# Patient Record
Sex: FEMALE | Race: WHITE | Employment: OTHER
[De-identification: names, ages, dates, MRNs, and addresses within clinical notes are randomized per-mention and may not be internally consistent; named-entity substitution may affect disease eponyms.]

---

## 2017-02-01 ENCOUNTER — TELEPHONE (OUTPATIENT)
Dept: CASE MANAGEMENT | Age: 72
End: 2017-02-01

## 2018-02-01 PROBLEM — H93.13 TINNITUS OF BOTH EARS: Status: ACTIVE | Noted: 2018-02-01

## 2018-07-18 ENCOUNTER — HOSPITAL ENCOUNTER (OUTPATIENT)
Dept: CT IMAGING | Age: 73
Discharge: HOME | End: 2018-07-20
Payer: COMMERCIAL

## 2018-07-18 DIAGNOSIS — J44.9 CHRONIC OBSTRUCTIVE PULMONARY DISEASE, UNSPECIFIED COPD TYPE (HCC): ICD-10-CM

## 2018-07-18 PROCEDURE — 71250 CT THORAX DX C-: CPT

## 2018-08-02 ENCOUNTER — OFFICE VISIT (OUTPATIENT)
Dept: PULMONOLOGY | Age: 73
End: 2018-08-02
Payer: COMMERCIAL

## 2018-08-02 VITALS
SYSTOLIC BLOOD PRESSURE: 138 MMHG | DIASTOLIC BLOOD PRESSURE: 78 MMHG | BODY MASS INDEX: 32.1 KG/M2 | RESPIRATION RATE: 12 BRPM | TEMPERATURE: 98.2 F | OXYGEN SATURATION: 95 % | WEIGHT: 188 LBS | HEART RATE: 98 BPM | HEIGHT: 64 IN

## 2018-08-02 DIAGNOSIS — J44.0 CHRONIC OBSTRUCTIVE PULMONARY DISEASE WITH ACUTE LOWER RESPIRATORY INFECTION (HCC): Primary | ICD-10-CM

## 2018-08-02 DIAGNOSIS — R91.1 LUNG NODULE: ICD-10-CM

## 2018-08-02 DIAGNOSIS — F17.200 SMOKER: ICD-10-CM

## 2018-08-02 DIAGNOSIS — R93.89 ABNORMAL RADIOGRAPH: ICD-10-CM

## 2018-08-02 LAB
DLCO %PRED: NORMAL
DLCO/VA %PRED: NORMAL
DLCO: NORMAL ML/MMHG SEC
FEF 25-75% PRE PRED: 1.84
FEF 25-75%-PRE: 2.28
FEV1 %PRED-PRE: 102
FEV1/FVC PRED: 78
FEV1/FVC: 80 %
FEV1: 2.24 LITERS
FEV3 PRE: 2.6
FVC %PRED-PRE: 98
FVC: 2.79 LITERS
MEP: NORMAL
MIP: NORMAL
PEF %PRED-PRE: 5.58
PEF-PRE: 7.18 L/SEC
TLC %PRED: NORMAL
TLC: NORMAL LITERS

## 2018-08-02 PROCEDURE — 99213 OFFICE O/P EST LOW 20 MIN: CPT | Performed by: INTERNAL MEDICINE

## 2018-08-02 PROCEDURE — 99214 OFFICE O/P EST MOD 30 MIN: CPT | Performed by: INTERNAL MEDICINE

## 2018-08-02 PROCEDURE — 94010 BREATHING CAPACITY TEST: CPT | Performed by: INTERNAL MEDICINE

## 2018-08-02 ASSESSMENT — PULMONARY FUNCTION TESTS
FEV1: 2.24
FEV1/FVC_PREDICTED: 78
FEV1_PERCENT_PREDICTED_PRE: 102
FVC: 2.79
FVC_PERCENT_PREDICTED_PRE: 98
FEV1/FVC: 80

## 2018-08-02 NOTE — PROGRESS NOTES
stable (2018) when compared with the previous exam.  A mass like lesion is noted projecting within the left breast soft tissues, measuring approximately 2.4 x 2.4 cm, partially excluded from the field-of-view. Finding was present on the previous exam. The breast issue is followed yearly has yearly US = breast cysts. ([facility]). ALLERGIES:    Allergies   Allergen Reactions    Celebrex [Celecoxib]     Lipitor        PAST MEDICAL HISTORY:       Diagnosis Date    Breast cyst     Patient states that she is well aware of the area. Patient states that it is a fluid filled cyst under the skin of her left breast that occasionally needs drained.  Chronic back pain     COPD (ch[facility] obstructive pulmonary disease) ([facility])     DLCO 64%    Fibrocystic disease of breast     Hyperlipidemia     Hypertension     Lung nodule     alpha 1 negative    Pneumonia     6 min walk test 12-17-12 85% predicted percent    Shoulder arthritis     Syncope     Tinnitus of both ears 2/1/2018    Tobacco dependence         MEDICATIONS:   Current Outpatient Prescriptions   Medication Sig Dispense Refill    lisinopril (PRINIVIL;ZESTRIL) 20 MG tablet Take 20 mg by mouth daily      fluticasone (FLONASE) 50 MCG/ACT nasal spray 1 spray by Nasal route daily as needed.  omeprazole (PRILOSEC) 40 MG capsule   Take 20 mg by mouth daily       atenolol (TENORMIN) 25 MG tablet Take 25 mg by mouth daily.  pravastatin (PRAVACHOL) 80 MG tablet Take 80 mg by mouth daily.  alprazolam (XANAX) 0.5 MG tablet   Take 0.5 mg by mouth 3 times daily       loratadine (CLARITIN) 10 MG tablet Take 10 mg by mouth daily.  fish oil-omega-3 fatty acids 1000 MG capsule Take 1 g by mouth daily.  calcium-vitamin D (OSCAL-500) 500-200 MG-UNIT per tablet Take 1 tablet by mouth 2 times daily.  vitamin E 400 UNIT capsule Take 400 Units by mouth daily.         therapeutic multivitamin-minerals (THERAGRAN-M) tablet Take 1 tablet by mouth daily.  Ascorbic Acid (VITAMIN C) 500 MG tablet Take 500 mg by mouth daily.  aspirin 81 MG EC tablet   Take 81 mg by mouth 2 times daily       diphenhydrAMINE (BENADRYL) 25 MG tablet Take 25 mg by mouth nightly as needed. No current facility-administered medications for this visit. SOCIAL AND OCCUPATIONAL HEALTH:  .   from small cell lung cancer. 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 She is a smoker for over 40 pack years. She has one dog at home. No birds. One brother who had TB as an infant. She travels frequently at least once a year; last time was a cruise (two months previously). Her hobbies include going to [employer]. She does at least once a month and to [place]. No asbestos or silica dust exposure. No history of recreational or IV drug use. No hot tub exposure. She likes to [employer] and is well at it. Goes to [facility] [employer] [facility]. SOCIAL HISTORY:   Social History   Substance Use Topics    Smoking status: Current Every Day Smoker     Packs/day: 1.00     Years: 50.00    Smokeless tobacco: Never Used      Comment: would like to quit     Alcohol use Yes      Comment: socially       SURGICAL HISTORY:   Past Surgical History:   Procedure Laterality Date    BREAST SURGERY      mass removal, benign    HYSTERECTOMY         FAMILY HISTORY: Mother  from myocardial infarction. Father  from lung cancer. One sibling  from leukemia and bladder cancer. One who had a stroke. Two daughters alive and well. One is present in the office today.   Family History   Problem Relation Age of Onset    Heart Disease Mother     High Cholesterol Mother     Cancer Father     Arthritis Sister     High Blood Pressure Sister     High Cholesterol Sister     Cancer Brother     Heart Disease Brother     High Blood Pressure Brother     High Cholesterol Brother     Tuberculosis Brother       Family Status   Relation Status    Mother     Father     Sister Alive    Brother Alive        REVIEW OF SYSTEMS: The patients health assessment form was reviewed. Constitutional: General health is good. No weight changes. No fevers. No fatigue or weakness. Head: Patient has history of syncope. Skin: Patient denies history of changes in pigmentation. No easy bruising or bleeding. EENT: Patient denies any history of color blindness, inflammation, cataracts or glaucoma. Patient denies history of deafness, tinnitus, pain, discharge or recurrent infections. Patient denies history of rhinitis or nasal polyps. Lymphatic: Patient denies history of enlargement, inflammation or pain. Cardiovascular: History of palpations and chest pain. No ascites, rheumatic fever, cold extremities or edema. Respiration: Smokes. Patient denies wheezing or dyspnea. No hemoptysis, TB or asthma. No signs or symptoms of sleep apnea. Rib fracture  Gastrointestinal: Patient denies changes in appetite. No dysphagia, odynophagia or abdominal pain. No hematochezia, melena or hemorrhoids. Colonscopy for IBS was negative . Polyp, rectum, biopsy: Fragments of hyperplastic polyp. C.  Duodenum, biopsy: Duodenal mucosa with normal villous architecture,  no significant pathologic abnormality identified. Genitourinary: Patient denies dysuria, frequency, urgency or incontinence. No history of stones. Musculoskeletal: History of arthritis or joint pains. Spinal > Lumber pain. Wrist fracture during move. Breasts: Fibrocystic breast diease. Mamogram yearly negative. Neurological: Patient denies vertigo. Psychological: Anxiety. Endocrine: No polyuria or polyphagia. No diabetes. Vit D deficiency. Hematopoietic: No history of bleeding disorders or easy bruising. Rheumatic: No polyarthritis or inflammatory joint disease.     PHYSICAL EXAMINATION:   Vitals:    18 1302   BP: 138/78   Pulse: 98   Resp: 12   Temp: 98.2 °F

## 2019-02-12 ENCOUNTER — OFFICE VISIT (OUTPATIENT)
Dept: PULMONOLOGY | Age: 74
End: 2019-02-12
Payer: MEDICARE

## 2019-02-12 VITALS
BODY MASS INDEX: 32.27 KG/M2 | HEART RATE: 99 BPM | TEMPERATURE: 97.6 F | DIASTOLIC BLOOD PRESSURE: 62 MMHG | WEIGHT: 189 LBS | OXYGEN SATURATION: 93 % | RESPIRATION RATE: 18 BRPM | SYSTOLIC BLOOD PRESSURE: 124 MMHG | HEIGHT: 64 IN

## 2019-02-12 DIAGNOSIS — J44.0 CHRONIC OBSTRUCTIVE PULMONARY DISEASE WITH ACUTE LOWER RESPIRATORY INFECTION (HCC): Primary | ICD-10-CM

## 2019-02-12 DIAGNOSIS — F17.200 SMOKER: ICD-10-CM

## 2019-02-12 DIAGNOSIS — R91.1 LUNG NODULE: ICD-10-CM

## 2019-02-12 LAB
EXPIRATORY TIME: NORMAL SEC
FEF 25-75% %PRED-PRE: NORMAL L/SEC
FEF 25-75% PRED: NORMAL L/SEC
FEF 25-75%-PRE: NORMAL L/SEC
FEV1 %PRED-PRE: 105 %
FEV1 PRED: 2.17 L
FEV1/FVC %PRED-PRE: 106 %
FEV1/FVC PRED: 78 %
FEV1/FVC: 83 %
FEV1: 2.29 L
FVC %PRED-PRE: 97 %
FVC PRED: 2.8 L
FVC: 2.74 L
PEF %PRED-PRE: NORMAL L/SEC
PEF PRED: NORMAL L/SEC
PEF-PRE: NORMAL L/SEC

## 2019-02-12 PROCEDURE — 4040F PNEUMOC VAC/ADMIN/RCVD: CPT | Performed by: INTERNAL MEDICINE

## 2019-02-12 PROCEDURE — G8417 CALC BMI ABV UP PARAM F/U: HCPCS | Performed by: INTERNAL MEDICINE

## 2019-02-12 PROCEDURE — 94010 BREATHING CAPACITY TEST: CPT | Performed by: INTERNAL MEDICINE

## 2019-02-12 PROCEDURE — G8482 FLU IMMUNIZE ORDER/ADMIN: HCPCS | Performed by: INTERNAL MEDICINE

## 2019-02-12 PROCEDURE — 4004F PT TOBACCO SCREEN RCVD TLK: CPT | Performed by: INTERNAL MEDICINE

## 2019-02-12 PROCEDURE — 1123F ACP DISCUSS/DSCN MKR DOCD: CPT | Performed by: INTERNAL MEDICINE

## 2019-02-12 PROCEDURE — 3017F COLORECTAL CA SCREEN DOC REV: CPT | Performed by: INTERNAL MEDICINE

## 2019-02-12 PROCEDURE — 99214 OFFICE O/P EST MOD 30 MIN: CPT | Performed by: INTERNAL MEDICINE

## 2019-02-12 PROCEDURE — 1101F PT FALLS ASSESS-DOCD LE1/YR: CPT | Performed by: INTERNAL MEDICINE

## 2019-02-12 PROCEDURE — G8427 DOCREV CUR MEDS BY ELIG CLIN: HCPCS | Performed by: INTERNAL MEDICINE

## 2019-02-12 PROCEDURE — 1090F PRES/ABSN URINE INCON ASSESS: CPT | Performed by: INTERNAL MEDICINE

## 2019-02-12 PROCEDURE — 99213 OFFICE O/P EST LOW 20 MIN: CPT | Performed by: INTERNAL MEDICINE

## 2019-02-12 PROCEDURE — G8925 SPIR FEV1/FVC>=60% & NO COPD: HCPCS | Performed by: INTERNAL MEDICINE

## 2019-02-12 PROCEDURE — G8399 PT W/DXA RESULTS DOCUMENT: HCPCS | Performed by: INTERNAL MEDICINE

## 2019-02-12 PROCEDURE — 3023F SPIROM DOC REV: CPT | Performed by: INTERNAL MEDICINE

## 2019-02-12 ASSESSMENT — PULMONARY FUNCTION TESTS
FEV1: 2.29
FEV1/FVC: 83
FVC_PERCENT_PREDICTED_PRE: 97
FVC: 2.74
FEV1/FVC_PERCENT_PREDICTED_PRE: 106
FEV1_PERCENT_PREDICTED_PRE: 105
FVC_PREDICTED: 2.80
FEV1_PREDICTED: 2.17
FEV1/FVC_PREDICTED: 78

## 2019-03-05 ENCOUNTER — HOSPITAL ENCOUNTER (OUTPATIENT)
Dept: MAMMOGRAPHY | Age: 74
Discharge: HOME | End: 2019-03-07
Payer: MEDICARE

## 2019-03-05 DIAGNOSIS — J44.9 CHRONIC OBSTRUCTIVE PULMONARY DISEASE, UNSPECIFIED COPD TYPE (HCC): ICD-10-CM

## 2019-03-05 DIAGNOSIS — Z12.31 ENCOUNTER FOR SCREENING MAMMOGRAM FOR MALIGNANT NEOPLASM OF BREAST: ICD-10-CM

## 2019-03-05 PROCEDURE — 77063 BREAST TOMOSYNTHESIS BI: CPT

## 2019-05-14 ENCOUNTER — HOSPITAL ENCOUNTER (OUTPATIENT)
Dept: GENERAL RADIOLOGY | Age: 74
Discharge: HOME | End: 2019-05-16
Payer: MEDICARE

## 2019-05-14 ENCOUNTER — HOSPITAL ENCOUNTER (OUTPATIENT)
Age: 74
Discharge: HOME | End: 2019-05-16
Payer: MEDICARE

## 2019-05-14 DIAGNOSIS — M14.862: ICD-10-CM

## 2019-05-14 PROCEDURE — 73562 X-RAY EXAM OF KNEE 3: CPT

## 2019-05-23 ENCOUNTER — TELEPHONE (OUTPATIENT)
Dept: ORTHOPEDIC SURGERY | Age: 74
End: 2019-05-23

## 2019-05-23 ENCOUNTER — OFFICE VISIT (OUTPATIENT)
Dept: ORTHOPEDIC SURGERY | Age: 74
End: 2019-05-23
Payer: MEDICARE

## 2019-05-23 VITALS
BODY MASS INDEX: 31.41 KG/M2 | SYSTOLIC BLOOD PRESSURE: 131 MMHG | DIASTOLIC BLOOD PRESSURE: 79 MMHG | HEIGHT: 64 IN | WEIGHT: 184 LBS | HEART RATE: 77 BPM | TEMPERATURE: 98.1 F

## 2019-05-23 DIAGNOSIS — M65.342 TRIGGER FINGER, LEFT RING FINGER: ICD-10-CM

## 2019-05-23 DIAGNOSIS — M17.12 PRIMARY OSTEOARTHRITIS OF LEFT KNEE: Primary | ICD-10-CM

## 2019-05-23 PROCEDURE — 20550 NJX 1 TENDON SHEATH/LIGAMENT: CPT | Performed by: ORTHOPAEDIC SURGERY

## 2019-05-23 PROCEDURE — 99204 OFFICE O/P NEW MOD 45 MIN: CPT | Performed by: ORTHOPAEDIC SURGERY

## 2019-05-23 PROCEDURE — 3017F COLORECTAL CA SCREEN DOC REV: CPT | Performed by: ORTHOPAEDIC SURGERY

## 2019-05-23 PROCEDURE — G8399 PT W/DXA RESULTS DOCUMENT: HCPCS | Performed by: ORTHOPAEDIC SURGERY

## 2019-05-23 PROCEDURE — 1123F ACP DISCUSS/DSCN MKR DOCD: CPT | Performed by: ORTHOPAEDIC SURGERY

## 2019-05-23 PROCEDURE — 4040F PNEUMOC VAC/ADMIN/RCVD: CPT | Performed by: ORTHOPAEDIC SURGERY

## 2019-05-23 PROCEDURE — 1090F PRES/ABSN URINE INCON ASSESS: CPT | Performed by: ORTHOPAEDIC SURGERY

## 2019-05-23 PROCEDURE — 4004F PT TOBACCO SCREEN RCVD TLK: CPT | Performed by: ORTHOPAEDIC SURGERY

## 2019-05-23 PROCEDURE — G8427 DOCREV CUR MEDS BY ELIG CLIN: HCPCS | Performed by: ORTHOPAEDIC SURGERY

## 2019-05-23 PROCEDURE — 20610 DRAIN/INJ JOINT/BURSA W/O US: CPT | Performed by: ORTHOPAEDIC SURGERY

## 2019-05-23 PROCEDURE — G8417 CALC BMI ABV UP PARAM F/U: HCPCS | Performed by: ORTHOPAEDIC SURGERY

## 2019-05-23 RX ORDER — TRIAMCINOLONE ACETONIDE 40 MG/ML
20 INJECTION, SUSPENSION INTRA-ARTICULAR; INTRAMUSCULAR ONCE
Status: COMPLETED | OUTPATIENT
Start: 2019-05-23 | End: 2019-05-23

## 2019-05-23 RX ORDER — TRIAMCINOLONE ACETONIDE 40 MG/ML
80 INJECTION, SUSPENSION INTRA-ARTICULAR; INTRAMUSCULAR ONCE
Status: COMPLETED | OUTPATIENT
Start: 2019-05-23 | End: 2019-05-23

## 2019-05-23 RX ORDER — LIDOCAINE HYDROCHLORIDE 10 MG/ML
3 INJECTION, SOLUTION INFILTRATION; PERINEURAL ONCE
Status: COMPLETED | OUTPATIENT
Start: 2019-05-23 | End: 2019-05-23

## 2019-05-23 RX ADMIN — LIDOCAINE HYDROCHLORIDE 3 ML: 10 INJECTION, SOLUTION INFILTRATION; PERINEURAL at 11:20

## 2019-05-23 RX ADMIN — TRIAMCINOLONE ACETONIDE 20 MG: 40 INJECTION, SUSPENSION INTRA-ARTICULAR; INTRAMUSCULAR at 11:13

## 2019-05-23 RX ADMIN — TRIAMCINOLONE ACETONIDE 80 MG: 40 INJECTION, SUSPENSION INTRA-ARTICULAR; INTRAMUSCULAR at 11:20

## 2019-05-23 NOTE — TELEPHONE ENCOUNTER
5/23/2019 12:42 pm Patient phoned office / message left on voice mail: Garrett Pimentel today. Have bone on bone arthrtis knees. Forgot to ask doctor for a prescription - handicap placard. Can barely walk anymore.  Order pended, on your desk for signature

## 2019-05-23 NOTE — PROGRESS NOTES
mouth daily.  alprazolam (XANAX) 0.5 MG tablet   Take 0.5 mg by mouth 3 times daily       loratadine (CLARITIN) 10 MG tablet Take 10 mg by mouth daily.  fish oil-omega-3 fatty acids 1000 MG capsule Take 1 g by mouth daily.  calcium-vitamin D (OSCAL-500) 500-200 MG-UNIT per tablet Take 1 tablet by mouth 2 times daily.  vitamin E 400 UNIT capsule Take 400 Units by mouth daily.  therapeutic multivitamin-minerals (THERAGRAN-M) tablet Take 1 tablet by mouth daily.  Ascorbic Acid (VITAMIN C) 500 MG tablet Take 500 mg by mouth daily.  aspirin 81 MG EC tablet   Take 81 mg by mouth 2 times daily       diphenhydrAMINE (BENADRYL) 25 MG tablet Take 25 mg by mouth nightly as needed. No current facility-administered medications for this visit. Allergies   Allergen Reactions    Celebrex [Celecoxib] Hives    Lipitor Hives       Social History     Socioeconomic History    Marital status:       Spouse name: Not on file    Number of children: Not on file    Years of education: Not on file    Highest education level: Not on file   Occupational History    Not on file   Social Needs    Financial resource strain: Not on file    Food insecurity:     Worry: Not on file     Inability: Not on file    Transportation needs:     Medical: Not on file     Non-medical: Not on file   Tobacco Use    Smoking status: Current Every Day Smoker     Packs/day: 1.00     Years: 50.00     Pack years: 50.00    Smokeless tobacco: Never Used    Tobacco comment: would like to quit    Substance and Sexual Activity    Alcohol use: Yes     Comment: socially    Drug use: No    Sexual activity: Never   Lifestyle    Physical activity:     Days per week: Not on file     Minutes per session: Not on file    Stress: Not on file   Relationships    Social connections:     Talks on phone: Not on file     Gets together: Not on file     Attends Amish service: Not on file     Active member of club or organization: Not on file     Attends meetings of clubs or organizations: Not on file     Relationship status: Not on file    Intimate partner violence:     Fear of current or ex partner: Not on file     Emotionally abused: Not on file     Physically abused: Not on file     Forced sexual activity: Not on file   Other Topics Concern    Not on file   Social History Narrative    Not on file       Family History   Problem Relation Age of Onset    Heart Disease Mother     High Cholesterol Mother     Cancer Father     Arthritis Sister     High Blood Pressure Sister     High Cholesterol Sister     Cancer Brother     Heart Disease Brother     High Blood Pressure Brother     High Cholesterol Brother     Tuberculosis Brother          Review of Systems   No fever, chills, or other constitutionalsymptoms. No numbness or other neuro symptoms. [unfilled]   Left knee varus. Synovitis but no drainable effusion or erythema. Range of motion 0-120° left knee. Left knee is stable to stress. No pain with left hip rotation. Left hand demonstrate palpable locking of the PIP joint left ring finger with triggering at the A1 flexor pulley level. Physical Exam    Patient is alert and oriented. Well-developed well-nourished. BMI 31.6  Pupils equal and reactive. Scleraeanicteric. Neck supple  Lungs clear. Cardiac rate and rhythm regular. Abdomen soft and nontender. Skin warm and dry. XRAY: Bilateral knee standing AP with flexion weightbearing and lateral views left knee today. Stage IV medial compartment joint space narrowing left knee especially on the flexion view with varus. Moderate patellofemoral degenerative changes also present. Right knee is less involved. Impression: Severe osteoarthritis left knee radiographically. ASSESSMENT/PLAN:    Gabby Harris was seen today for knee pain.     Diagnoses and all orders for this visit:    Primary osteoarthritis of left knee  - IN ARTHROCENTESIS ASPIR&/INJ MAJOR JT/BURSA W/O US  -     Amb External Referral To Physical Therapy    Trigger finger, left ring finger  -     IN INJECT TENDON SHEATH/LIGAMENT    Other orders  -     triamcinolone acetonide (KENALOG-40) injection 80 mg  -     lidocaine 1 % injection 3 mL  -     triamcinolone acetonide (KENALOG-40) injection 20 mg     Findings and options reviewed with the patient. At some point she will likely require left knee arthroplasty however recent symptoms suggest further conservative measures at this time. We discussed appropriate doses of ibuprofen plus add Tylenol. PT to instruct in knee exercise program.  She did wish to try left knee steroid injection today. With respect to her left hand, pathology or trigger finger discussed. Suggest initial treatment with flexor sheath steroid injection although surgical treatment may also be indicated and was reviewed with her today. After review of indications, risks and limitations, after alcohol prep, left knee injection with triamcinolone 80mg and 3 cc 1% lidocaine given today. Pt tolerated without complication. Left hand palmar injection flexor tendon sheath ring digit prep with alcohol tendon sheath injected with 20 mg Kenalog without difficulty well tolerated by patient. Return in about 1 month (around 6/23/2019).        Carlos Enrique Peck MD    5/23/2019  12:09 PM

## 2019-05-23 NOTE — LETTER
[address] [facility].  [address] [facility] [address]  Phone: [phone]  Fax: [phone]    Sarah Vargas MD        May 23, 2019     Patient: Salma Peacock   YOB: 1945   Date of Visit: 5/23/2019       To Whom It May Concern: It is my medical opinion that Wiliam Cross requires a disability parking placard for the following reasons: An orthopedic condition   Duration of need:  Permanent    If you have any questions or concerns, please don't hesitate to call.     Sincerely,        Sarah Vargas MD

## 2019-05-28 ENCOUNTER — TELEPHONE (OUTPATIENT)
Dept: ORTHOPEDIC SURGERY | Age: 74
End: 2019-05-28

## 2019-05-28 NOTE — TELEPHONE ENCOUNTER
5/28/29019 Patient phoned office / message left on voice mail: \"Having a lot of knee pain. \" \"Nothing is working. \" \"Have tried taking 4 Advil and 2 Tylenol, helps only about 20%. \" \"Can't stand this pain. \"  Patient asks if there is something stronger doctor can prescribe to Eveline Coley in [facility].    5/28/2019 11:33 am Follow up phone / spoke with and informed patient: Will send message to [address] [place] [address],[address] who is in surgery all day / Will call with his response. Patient expresses understanding and is in agreement.

## 2019-05-29 ENCOUNTER — OFFICE VISIT (OUTPATIENT)
Dept: ORTHOPEDIC SURGERY | Age: 74
End: 2019-05-29
Payer: MEDICARE

## 2019-05-29 VITALS
TEMPERATURE: 97.8 F | HEART RATE: 84 BPM | DIASTOLIC BLOOD PRESSURE: 77 MMHG | HEIGHT: 64 IN | WEIGHT: 184 LBS | BODY MASS INDEX: 31.41 KG/M2 | SYSTOLIC BLOOD PRESSURE: 140 MMHG

## 2019-05-29 DIAGNOSIS — M17.12 PRIMARY OSTEOARTHRITIS OF LEFT KNEE: Primary | ICD-10-CM

## 2019-05-29 PROCEDURE — 99213 OFFICE O/P EST LOW 20 MIN: CPT | Performed by: ORTHOPAEDIC SURGERY

## 2019-05-29 PROCEDURE — 4004F PT TOBACCO SCREEN RCVD TLK: CPT | Performed by: ORTHOPAEDIC SURGERY

## 2019-05-29 PROCEDURE — 3017F COLORECTAL CA SCREEN DOC REV: CPT | Performed by: ORTHOPAEDIC SURGERY

## 2019-05-29 PROCEDURE — 4040F PNEUMOC VAC/ADMIN/RCVD: CPT | Performed by: ORTHOPAEDIC SURGERY

## 2019-05-29 PROCEDURE — 1090F PRES/ABSN URINE INCON ASSESS: CPT | Performed by: ORTHOPAEDIC SURGERY

## 2019-05-29 PROCEDURE — 1123F ACP DISCUSS/DSCN MKR DOCD: CPT | Performed by: ORTHOPAEDIC SURGERY

## 2019-05-29 PROCEDURE — G8399 PT W/DXA RESULTS DOCUMENT: HCPCS | Performed by: ORTHOPAEDIC SURGERY

## 2019-05-29 PROCEDURE — 20610 DRAIN/INJ JOINT/BURSA W/O US: CPT | Performed by: ORTHOPAEDIC SURGERY

## 2019-05-29 PROCEDURE — G8427 DOCREV CUR MEDS BY ELIG CLIN: HCPCS | Performed by: ORTHOPAEDIC SURGERY

## 2019-05-29 PROCEDURE — L1810 KO ELASTIC WITH JOINTS: HCPCS | Performed by: ORTHOPAEDIC SURGERY

## 2019-05-29 PROCEDURE — G8417 CALC BMI ABV UP PARAM F/U: HCPCS | Performed by: ORTHOPAEDIC SURGERY

## 2019-05-29 RX ORDER — LIDOCAINE HYDROCHLORIDE 10 MG/ML
3 INJECTION, SOLUTION INFILTRATION; PERINEURAL ONCE
Status: COMPLETED | OUTPATIENT
Start: 2019-05-29 | End: 2019-05-29

## 2019-05-29 RX ORDER — DICLOFENAC SODIUM 75 MG/1
75 TABLET, DELAYED RELEASE ORAL 2 TIMES DAILY
Qty: 60 TABLET | Refills: 3 | Status: SHIPPED | OUTPATIENT
Start: 2019-05-29

## 2019-05-29 RX ORDER — TRIAMCINOLONE ACETONIDE 40 MG/ML
80 INJECTION, SUSPENSION INTRA-ARTICULAR; INTRAMUSCULAR ONCE
Status: COMPLETED | OUTPATIENT
Start: 2019-05-29 | End: 2019-05-29

## 2019-05-29 RX ADMIN — TRIAMCINOLONE ACETONIDE 80 MG: 40 INJECTION, SUSPENSION INTRA-ARTICULAR; INTRAMUSCULAR at 15:31

## 2019-05-29 RX ADMIN — LIDOCAINE HYDROCHLORIDE 3 ML: 10 INJECTION, SOLUTION INFILTRATION; PERINEURAL at 15:30

## 2019-05-29 NOTE — PROGRESS NOTES
Chief Complaint:   Chief Complaint   Patient presents with    Knee Pain     Increase in left knee pain. No improvement of pain after injection 5/23/2019       HPI 53-year-old woman seen last week with left knee pain underlying osteoarthritis. Steroid injection at that time was given but she got no relief. Inadequate relief with ibuprofen. Also had an injection for a left ring trigger finger. Pain improved still some partial locking but not as severe as last week. Patient Active Problem List   Diagnosis    Lung nodule    COPD [facility]ch[facility]) ([facility])    Smoker    Abnormal radiograph    Snoring    Tinnitus of both ears    Tinnitus of both ears       Past Medical History:   Diagnosis Date    Breast cyst     Patient states that she is well aware of the area. Patient states that it is a fluid filled cyst under the skin of her left breast that occasionally needs drained.  Chronic back pain     COPD (chronic obstructive pulmonary disease) ([facility])     DLCO 64%    Fibrocystic disease of breast     Hyperlipidemia     Hypertension     Lung nodule     alpha 1 negative    Pneumonia     6 min walk test 12-17-12 85% predicted percent    Shoulder arthritis     Syncope     Tinnitus of both ears 2/1/2018    Tobacco dependence        Past Surgical History:   Procedure Laterality Date    BREAST SURGERY      mass removal, benign    HYSTERECTOMY         Current Outpatient Medications   Medication Sig Dispense Refill    diclofenac (VOLTAREN) 75 MG EC tablet Take 1 tablet by mouth 2 times daily 60 tablet 3    metoprolol tartrate (LOPRESSOR) 25 MG tablet TAKE ONE TABLET BY MOUTH TWO TIMES A DAY  11    lisinopril (PRINIVIL;ZESTRIL) 20 MG tablet Take 20 mg by mouth daily      omeprazole (PRILOSEC) 40 MG capsule   Take 20 mg by mouth daily       atenolol (TENORMIN) 25 MG tablet Take 25 mg by mouth daily.  pravastatin (PRAVACHOL) 80 MG tablet Take 80 mg by mouth daily.         alprazolam (XANAX) 0.5 MG tablet   Take 0.5 mg by mouth 3 times daily       loratadine (CLARITIN) 10 MG tablet Take 10 mg by mouth daily.  fish oil-omega-3 fatty acids 1000 MG capsule Take 1 g by mouth daily.  calcium-vitamin D (OSCAL-500) 500-200 MG-UNIT per tablet Take 1 tablet by mouth 2 times daily.  vitamin E 400 UNIT capsule Take 400 Units by mouth daily.  therapeutic multivitamin-minerals (THERAGRAN-M) tablet Take 1 tablet by mouth daily.  Ascorbic Acid (VITAMIN C) 500 MG tablet Take 500 mg by mouth daily.  aspirin 81 MG EC tablet   Take 81 mg by mouth 2 times daily       diphenhydrAMINE (BENADRYL) 25 MG tablet Take 25 mg by mouth nightly as needed. Current Facility-Administered Medications   Medication Dose Route Frequency Provider Last Rate Last Dose    triamcinolone acetonide (KENALOG-40) injection 80 mg  80 mg Intra-articular Once Kayley MD Maite           Allergies   Allergen Reactions    Celebrex [Celecoxib] Hives    Lipitor Hives       Social History     Socioeconomic History    Marital status:       Spouse name: None    Number of children: None    Years of education: None    Highest education level: None   Occupational History    None   Social Needs    Financial resource strain: None    Food insecurity:     Worry: None     Inability: None    Transportation needs:     Medical: None     Non-medical: None   Tobacco Use    Smoking status: Current Every Day Smoker     Packs/day: 1.00     Years: 50.00     Pack years: 50.00    Smokeless tobacco: Never Used    Tobacco comment: would like to quit    Substance and Sexual Activity    Alcohol use: Yes     Comment: socially    Drug use: No    Sexual activity: Never   Lifestyle    Physical activity:     Days per week: None     Minutes per session: None    Stress: None   Relationships    Social connections:     Talks on phone: None     Gets together: None     Attends Scientologist service: None     Active member of club or organization: None     Attends meetings of clubs or organizations: None     Relationship status: None    Intimate partner violence:     Fear of current or ex partner: None     Emotionally abused: None     Physically abused: None     Forced sexual activity: None   Other Topics Concern    None   Social History Narrative    None       Family History   Problem Relation Age of Onset    Heart Disease Mother     High Cholesterol Mother     Cancer Father     Arthritis Sister     High Blood Pressure Sister     High Cholesterol Sister     Cancer Brother     Heart Disease Brother     High Blood Pressure Brother     High Cholesterol Brother     Tuberculosis Brother          Review of Systems   No fever, chills, or other constitutionalsymptoms. No numbness or other neuro symptoms. [unfilled]   Left knee has no erythema or effusion. Tender over the anterior and medial aspect of the joint with varus. Range of motion 0-120°. Left hand ring finger again demonstrates triggering but the locking is not as pronounced as last week. Physical Exam    Patient is alert and oriented. Well-developed well-nourished. Pupils equal and reactive. Scleraeanicteric. Neck supple  Lungs clear. Cardiac rate and rhythm regular. Abdomen soft and nontender. Skin warm and dry. XRAY: X-rays left knee last week demonstrate stage IV medial degenerative changes. ASSESSMENT/PLAN:    Nora Nichols was seen today for knee pain. Diagnoses and all orders for this visit:    Primary osteoarthritis of left knee  -     OK ARTHROCENTESIS ASPIR&/INJ MAJOR JT/BURSA W/O US  -     OK KO ELASTIC WITH JOINTS    Other orders  -     triamcinolone acetonide (KENALOG-40) injection 80 mg  -     lidocaine 1 % injection 3 mL  -     diclofenac (VOLTAREN) 75 MG EC tablet; Take 1 tablet by mouth 2 times daily     Options reviewed.   I suspect the injection did not successfully reach the intra-articular space last week. Suggested trying 1 more injection left knee as well as switching from ibuprofen to trial of diclofenac. Patient has history of allergy to Celebrex which caused hives, she does not have adverse reactions to other NSAIDs. Also suggest fitting hinged knee brace. After review of indications, risks and limitations, after alcohol prep, left knee injection with triamcinolone 80mg and 3 cc 1% lidocaine given today. Pt tolerated without complication. Return for As scheduled.        Glynn Yeboah MD    5/29/2019  3:31 PM

## 2019-06-10 ENCOUNTER — TELEPHONE (OUTPATIENT)
Dept: ORTHOPEDIC SURGERY | Age: 74
End: 2019-06-10

## 2019-06-10 NOTE — TELEPHONE ENCOUNTER
6/10/2019 10:56 am Patient phoned office / message left on voice mail: Saw doctor for my sore left knee. Voltaren helped the pain a lot but cannot live with the side effects: I am tired all the time, just want to sleep, have extreme weakness and loss of appetite. Yesterday I got terrible sweats so bad I had to change my clothes. I finally realized what was going on and stopped taking the Voltaren. Since this all started I've lost 10#. I took 4 Advil and 2 Tylenol ES about 20 minutes ago. Don't know if it will work or not. Question: What does doctor have to say about taking Advil and Tylenol?

## 2019-07-02 ENCOUNTER — TELEPHONE (OUTPATIENT)
Dept: PULMONOLOGY | Age: 74
End: 2019-07-02

## 2019-08-01 ENCOUNTER — HOSPITAL ENCOUNTER (OUTPATIENT)
Dept: CT IMAGING | Age: 74
Discharge: HOME | End: 2019-08-03
Payer: MEDICARE

## 2019-08-01 DIAGNOSIS — J44.0 ACUTE BRONCHITIS WITH CHRONIC OBSTRUCTIVE PULMONARY DISEASE (COPD) (HCC): ICD-10-CM

## 2019-08-01 DIAGNOSIS — F17.200 TOBACCO USE DISORDER: ICD-10-CM

## 2019-08-01 DIAGNOSIS — R91.1 LUNG NODULE: ICD-10-CM

## 2019-08-01 DIAGNOSIS — J20.9 ACUTE BRONCHITIS WITH CHRONIC OBSTRUCTIVE PULMONARY DISEASE (COPD) (HCC): ICD-10-CM

## 2019-08-01 PROCEDURE — 71250 CT THORAX DX C-: CPT

## 2019-08-12 ENCOUNTER — OFFICE VISIT (OUTPATIENT)
Dept: PULMONOLOGY | Age: 74
End: 2019-08-12
Payer: MEDICARE

## 2019-08-12 VITALS
HEART RATE: 72 BPM | OXYGEN SATURATION: 98 % | TEMPERATURE: 98.3 F | RESPIRATION RATE: 23 BRPM | WEIGHT: 182.3 LBS | HEIGHT: 64 IN | DIASTOLIC BLOOD PRESSURE: 65 MMHG | BODY MASS INDEX: 31.12 KG/M2 | SYSTOLIC BLOOD PRESSURE: 143 MMHG

## 2019-08-12 DIAGNOSIS — R91.1 LUNG NODULE: Primary | ICD-10-CM

## 2019-08-12 LAB
EXPIRATORY TIME: 7.46 SEC
FEF 25-75% %PRED-PRE: 153 L/SEC
FEF 25-75% PRED: 1.8 L/SEC
FEF 25-75%-PRE: 2.76 L/SEC
FEV1 %PRED-PRE: 104 %
FEV1 PRED: 2.15 L
FEV1/FVC %PRED-PRE: 107 %
FEV1/FVC PRED: 78 %
FEV1/FVC: 84 %
FEV1: 2.24 L
FVC %PRED-PRE: 95 %
FVC PRED: 2.79 L
FVC: 2.67 L
PEF %PRED-PRE: 110 L/SEC
PEF PRED: 5.5 L/SEC
PEF-PRE: 6.09 L/SEC

## 2019-08-12 PROCEDURE — 99213 OFFICE O/P EST LOW 20 MIN: CPT | Performed by: INTERNAL MEDICINE

## 2019-08-12 PROCEDURE — G8417 CALC BMI ABV UP PARAM F/U: HCPCS | Performed by: INTERNAL MEDICINE

## 2019-08-12 PROCEDURE — 3017F COLORECTAL CA SCREEN DOC REV: CPT | Performed by: INTERNAL MEDICINE

## 2019-08-12 PROCEDURE — 90732 PPSV23 VACC 2 YRS+ SUBQ/IM: CPT

## 2019-08-12 PROCEDURE — 99214 OFFICE O/P EST MOD 30 MIN: CPT | Performed by: INTERNAL MEDICINE

## 2019-08-12 PROCEDURE — G8399 PT W/DXA RESULTS DOCUMENT: HCPCS | Performed by: INTERNAL MEDICINE

## 2019-08-12 PROCEDURE — G8427 DOCREV CUR MEDS BY ELIG CLIN: HCPCS | Performed by: INTERNAL MEDICINE

## 2019-08-12 PROCEDURE — G0009 ADMIN PNEUMOCOCCAL VACCINE: HCPCS

## 2019-08-12 PROCEDURE — 4004F PT TOBACCO SCREEN RCVD TLK: CPT | Performed by: INTERNAL MEDICINE

## 2019-08-12 PROCEDURE — 94010 BREATHING CAPACITY TEST: CPT | Performed by: INTERNAL MEDICINE

## 2019-08-12 PROCEDURE — 1090F PRES/ABSN URINE INCON ASSESS: CPT | Performed by: INTERNAL MEDICINE

## 2019-08-12 PROCEDURE — 1123F ACP DISCUSS/DSCN MKR DOCD: CPT | Performed by: INTERNAL MEDICINE

## 2019-08-12 PROCEDURE — 4040F PNEUMOC VAC/ADMIN/RCVD: CPT | Performed by: INTERNAL MEDICINE

## 2019-08-12 PROCEDURE — 6360000002 HC RX W HCPCS

## 2019-08-12 ASSESSMENT — PULMONARY FUNCTION TESTS
FEV1: 2.24
FEV1/FVC_PERCENT_PREDICTED_PRE: 107
FEV1/FVC: 84
FEV1_PERCENT_PREDICTED_PRE: 104
FVC: 2.67
FEV1_PREDICTED: 2.15
FVC_PREDICTED: 2.79
FEV1/FVC_PREDICTED: 78
FVC_PERCENT_PREDICTED_PRE: 95

## 2019-08-12 NOTE — PROGRESS NOTES
[place]  Department of Pulmonary, Critical Care and Sleep Medicine  [address] [facility]  Department of Internal Medicine  Daily Progress Note    Wilver Eaton DO, [place], [facility], [address] Seamus Richardson MD, [place]    Dear Juana Escudero DO    We had the pleasure of seeing your patient, Demetrius Wilson, in the [address] regarding her 4mm lung nodules & tobacco abuse. HISTORY OF PRESENT ILLNESS:    Demetrius Wilson is a 72 y.o. female with hyperlipidemia, hypertension, tobacco abuse, lung nodule, history of syncope on beta blockers, fibrocystic breast disease and chronic pain. 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 She was on Chantix before and actually did quite well and was able to reduce her tobacco use but stopped it due to one fleeting episode that occurred while driving. Her history goes back to July, when she developed muscle pain; diffusely. Initially thought it was a bladder infection and started on Ciprofloxacin. Unfortunately, Ciprofloxacin made symptoms worse. She went back to see you, at that point, you thought it was the Lipitor and medication was stopped. She was given a Medrol dose pack and some Darvocet with an improvement. Throughout all of this, she continued to smoke and for completeness, she underwent a CT scan of the chest, abdomen and pelvis of which reported a 2- 3 mm nodule in the right upper lobe, normal abdomen and pelvis and an asymmetrical 1.7 mm soft tissue density in the left breast which I think is well known. We also discussed her smoking in detail and recommended an updated TB skin test. (Because she has a family history of a brother having TB and was sent to the [facility]) which was negative. On today visit, She denies chest pain, shortness of breath or hemoptysis.   She has no fever, chills, chest pain, pleurisy, cough or

## 2020-01-15 ENCOUNTER — HOSPITAL ENCOUNTER (OUTPATIENT)
Dept: GENERAL RADIOLOGY | Age: 75
Discharge: HOME | End: 2020-01-17
Payer: MEDICARE

## 2020-01-15 PROCEDURE — G0279 TOMOSYNTHESIS, MAMMO: HCPCS

## 2020-01-15 PROCEDURE — 76642 ULTRASOUND BREAST LIMITED: CPT

## 2020-02-17 ENCOUNTER — OFFICE VISIT (OUTPATIENT)
Dept: PULMONOLOGY | Age: 75
End: 2020-02-17
Payer: MEDICARE

## 2020-02-17 VITALS
RESPIRATION RATE: 14 BRPM | HEIGHT: 64 IN | SYSTOLIC BLOOD PRESSURE: 138 MMHG | BODY MASS INDEX: 30.29 KG/M2 | OXYGEN SATURATION: 97 % | DIASTOLIC BLOOD PRESSURE: 87 MMHG | WEIGHT: 177.4 LBS | HEART RATE: 91 BPM

## 2020-02-17 LAB
EXPIRATORY TIME: 7.5 SEC
FEF 25-75% %PRED-PRE: 159 L/SEC
FEF 25-75% PRED: 1.79 L/SEC
FEF 25-75%-PRE: 2.86 L/SEC
FEV1 %PRED-PRE: 104 %
FEV1 PRED: 2.14 L
FEV1/FVC %PRED-PRE: 107 %
FEV1/FVC PRED: 78 %
FEV1/FVC: 84 %
FEV1: 2.23 L
FVC %PRED-PRE: 96 %
FVC PRED: 2.77 L
FVC: 2.66 L
PEF %PRED-PRE: 84 L/SEC
PEF PRED: 5.45 L/SEC
PEF-PRE: 4.62 L/SEC

## 2020-02-17 PROCEDURE — 99213 OFFICE O/P EST LOW 20 MIN: CPT | Performed by: INTERNAL MEDICINE

## 2020-02-17 PROCEDURE — 94010 BREATHING CAPACITY TEST: CPT | Performed by: INTERNAL MEDICINE

## 2020-02-17 PROCEDURE — 99214 OFFICE O/P EST MOD 30 MIN: CPT | Performed by: INTERNAL MEDICINE

## 2020-02-17 ASSESSMENT — PULMONARY FUNCTION TESTS
FVC_PERCENT_PREDICTED_PRE: 96
FEV1/FVC_PERCENT_PREDICTED_PRE: 107
FVC: 2.66
FEV1: 2.23
FEV1_PREDICTED: 2.14
FVC_PREDICTED: 2.77
FEV1/FVC_PREDICTED: 78
FEV1_PERCENT_PREDICTED_PRE: 104
FEV1/FVC: 84

## 2020-02-17 NOTE — PATIENT INSTRUCTIONS
chew.     · Eat foods that contain protein so that you do not lose muscle mass.     · Talk with your doctor if you gain too much weight or if you lose weight without trying.    Mental health    · Talk to your family, friends, or a therapist about your feelings. It is normal to feel frightened, angry, hopeless, helpless, and even guilty. Talking openly about bad feelings can help you cope. If these feelings last, talk to your doctor. When should you call for help? Call 911 anytime you think you may need emergency care. For example, call if:    · You have severe trouble breathing.    Call your doctor now or seek immediate medical care if:    · You have new or worse trouble breathing.     · You cough up blood.     · You have a fever.    Watch closely for changes in your health, and be sure to contact your doctor if:    · You cough more deeply or more often, especially if you notice more mucus or a change in the color of your mucus.     · You have new or worse swelling in your legs or belly.     · You are not getting better as expected. Where can you learn more? Go to https://FOODITYpeHYGIEIA.Blinpick. org and sign in to your [employer] account. Enter X038 in the [employer] Business Plan box to learn more about \"Chronic Obstructive Pulmonary Disease (COPD): Care Instructions. \"     If you do not have an account, please click on the \"Sign Up Now\" link. Current as of: June 9, 2019  Content Version: 12.3  © 8175-1373 [employer]wise, Inc[employer]. Care instructions adapted under license by [facility] ([facility]). If you have questions about a medical condition or this instruction, always ask your healthcare professional. [address] any warranty or liability for your use of this information.

## 2020-02-17 NOTE — PROGRESS NOTES
Patient to follow up with physician in 6 months. Patient to have a CT Chest prior to next office visit.

## 2020-02-17 NOTE — PROGRESS NOTES
daily.        vitamin E 400 UNIT capsule Take 400 Units by mouth daily.  therapeutic multivitamin-minerals (THERAGRAN-M) tablet Take 1 tablet by mouth daily.  Ascorbic Acid (VITAMIN C) 500 MG tablet Take 500 mg by mouth daily.  aspirin 81 MG EC tablet   Take 81 mg by mouth 2 times daily       diphenhydrAMINE (BENADRYL) 25 MG tablet Take 25 mg by mouth nightly as needed. No current facility-administered medications for this visit. SOCIAL AND OCCUPATIONAL HEALTH:  .   from small cell lung cancer. 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 She is a smoker for over 40 pack years. She has one dog at home. No birds. One brother who had TB as an infant. She travels frequently at least once a year; last time was a cruise (two months previously). Her hobbies include going to [employer] She does at least once a month and to [place]. No asbestos or silica dust exposure. No history of recreational or IV drug use. No hot tub exposure. She likes to [place] and is well at it. Goes to [employer]. SOCIAL HISTORY:   Social History     Tobacco Use    Smoking status: Current Every Day Smoker     Packs/day: 1.00     Years: 50.00     Pack years: 50.00    Smokeless tobacco: Never Used    Tobacco comment: would like to quit    Substance Use Topics    Alcohol use: Yes     Comment: socially    Drug use: No       SURGICAL HISTORY:   Past Surgical History:   Procedure Laterality Date    BREAST SURGERY      mass removal, benign    HYSTERECTOMY         FAMILY HISTORY: Mother  from myocardial infarction. Father  from lung cancer. One sibling  from leukemia and bladder cancer. One who had a stroke. Two daughters alive and well. One is present in the office today.   Family History   Problem Relation Age of Onset    Heart Disease Mother     High Cholesterol Mother     Cancer Father     Arthritis Sister     High Blood Pressure Sister     High Cholesterol Sister     Cancer Brother     Heart Disease Brother     High Blood Pressure Brother     High Cholesterol Brother     Tuberculosis Brother       Family Status   Relation Name Status    Mother      Father      Sister  Alive    Brother  Alive        REVIEW OF SYSTEMS: The patients health assessment form was reviewed. Constitutional: General health is good. No weight changes. No fevers. No fatigue or weakness. Head: Patient has history of syncope. Skin: Patient denies history of changes in pigmentation. No easy bruising or bleeding. EENT: Patient denies any history of color blindness, inflammation, cataracts or glaucoma. Patient denies history of deafness, tinnitus, pain, discharge or recurrent infections. Patient denies history of rhinitis or nasal polyps. Lymphatic: Patient denies history of enlargement, inflammation or pain. Cardiovascular: History of palpations and chest pain. No ascites, rheumatic fever, cold extremities or edema. Respiration: Smokes. Patient denies wheezing or dyspnea. No hemoptysis, TB or asthma. No signs or symptoms of sleep apnea. Rib fracture  Gastrointestinal: Patient denies changes in appetite. No dysphagia, odynophagia or abdominal pain. No hematochezia, melena or hemorrhoids. Colonscopy for IBS was negative . Polyp, rectum, biopsy: Fragments of hyperplastic polyp. C.  Duodenum, biopsy: Duodenal mucosa with normal villous architecture,  no significant pathologic abnormality identified. Genitourinary: Patient denies dysuria, frequency, urgency or incontinence. No history of stones. Musculoskeletal: History of arthritis or joint pains. Spinal > Lumber pain. Wrist fracture during move. Left knee OA with effusion received injections (2019)  Breasts: Fibrocystic breast diease. Mamogram yearly negative. Neurological: Patient denies vertigo. Psychological: Anxiety.    Endocrine: No flow rates are 152 % of predicted. Maximum voluntary ventilation is 113 liters per minute or 127 % of predicted. Flow volume loop shows no signs of intrathoracic or extrathoracic process. Impression: Normal Spirometry    Static Lung Volume: reveal a slow vital capacity of 3.74 liters which is 115 % of predicted. The inspiratory capacity is 2.11 liters, 95% of predicted. The thoracic gas volume is 2.97 liters which is normal.  The total lung capacity is 5.08 liters, 98 % of predicted. The RV/TLC ratio is 64 % of predicted. The DlCO is 20.70 ml/min/mmHg which is 82% of predicted. The DlCO/VA (krogh constant) is 6.20 ml/min/mmHg, 120 % of predicted. CT SCAN CHEST (8/2019) Degenerative changes noted in the spine. Thoracic aorta is normal in caliber and mildly atherosclerotic. Stable nodules in the right upper lobe measuring up to 4 mm in size. Central airways are patent. No new nodule, pleural effusion or pneumothorax seen. Thyroid gland and esophagus are unremarkable. Heart is normal in size. No pericardial effusion. Colonic diverticuli with no evidence of diverticulitis. Visualized upper abdominal organs are otherwise unremarkable. No axillary, mediastinal, hilar or supraclavicular lymphadenopathy seen. CHEST CT SCAN 8/2/2018: Impression: Noted 2 previously documented nodules in the right upper lung are less conspicuous, representing a lung RADS category 2 (benign finding). There is a previously documented left breast mass of 2.3 cm diameter, which is not new, and may represent fibrocystic change. Mammographic  and/or ultrasound to better characterize this finding. CHEST CT SCAN [1/2018]: Pulmonary nodule (series 3, image 17), measuring approximately 0.55 cm x 0.44 cm, not seen on the previous exam. Pulmonary nodule (series 3, image 11), measuring approximately 0.3 cm, stable when compared with the previous exam. Left lung: No noncalcified pulmonary nodule or spiculated mass.  No supraclavicular, axillary, or mediastinal lymphadenopathy. Hilar lymphadenopathy is difficult to ascertain given the lack of IV contrast administration. . A mass like lesion is noted projecting within the left breast soft tissues, measuring approximately 2.4 x 2.4 cm, partially excluded from the field-of-view. Finding was present on the previous exam. Visualized portions of the thyroid, heart, esophagus, stomach, liver, gallbladder, spleen, adrenals, kidneys are unremarkable. Pancreas is unremarkable. No lytic or blastic bony lesions. CHEST CT SCAN [2017] Impression: Stable right apical pulmonary nodule as described above. Recommend follow up as per Fleischner criteria below. 2. No focal consolidation, pleural effusion or pneumothorax. 3. Cystic mass lesion in the subcutaneous soft tissues of the anterior left chest wall.  (she is following for the cyst in [facility])    CHEST CT SCAN [2016] Pulmonary nodule (series 3, image 11), measuring approximately 0.31 cm, enlarged since previous exam at which time it measured approximately 0.18 cm. 2. Pulmonary nodule (series 3, image 20), measuring approximately 0.27 cm, not seen on previous exam.  Left lun. No noncalcified pulmonary nodule or spiculated mass. No supraclavicular, mediastinal, or hilar lymphadenopathy is identified. Bilateral axillary lymphadenopathy, on the left measure approximately 1.4 x 1.7 cm and on the right measuring approximately  1.2 x 1.4 cm. CHEST CT SCAN [2015] There are nodular opacities within the left breast unchanged. The adrenal glands appear normal. There are again spinal degenerative changes. Since , there is bi apical pleural parenchymal scars. Again no discrete pulmonary nodule is seen. Scars changes at the medial aspect of the right middle lobe and into the lingula peripherally.      CHEST CT SCAN [2014] There are nodular opacities within the left breast one posteriorly and centrally 8.5 mm and the other centrally and more PLAN:     She lung function remains in the normal.  We have followed Zeeshan Ingram lung nodule for > 8 years and continue to follow because of her family history and smoking - she was here today to discuss her yearly lung cancer screening. She is to follow up in  6 months for COPD and we will now go yearly on the CT scan since she continues to smoke. We again discussed the smoking cessation program. Health maintenance screening evaluation such as breast and colonoscopy are normal and up-to-date. If she would develop any chest wall pain, hemoptysis, frequent cough, weight loss or anorexia, she will call the office for additional evaluation. We hope this updates you on our evaluation and clinical thinking. We discussed her new  she met online datings site. Thank you for entrusting us to participate in [facility].       Sincerely,      Spring Teran D.O., MPH, Nuno Shin  Professor of Medicine  Director, Isa Lynch

## 2020-03-25 PROBLEM — H93.13 TINNITUS OF BOTH EARS: Status: RESOLVED | Noted: 2018-02-01 | Resolved: 2020-03-24

## 2020-07-14 ENCOUNTER — TELEPHONE (OUTPATIENT)
Dept: PULMONOLOGY | Age: 75
End: 2020-07-14

## 2020-07-14 NOTE — TELEPHONE ENCOUNTER
Mailed a letter informing her that her CT Chest is scheduled for 8-17-20 at 11:00 am at the [facility]. She must arrive by 10:30 am. There is no prep for this test

## 2020-07-15 ENCOUNTER — HOSPITAL ENCOUNTER (OUTPATIENT)
Dept: GENERAL RADIOLOGY | Age: 75
Discharge: HOME | End: 2020-07-17
Payer: MEDICARE

## 2020-07-15 PROCEDURE — 76642 ULTRASOUND BREAST LIMITED: CPT

## 2020-07-27 ENCOUNTER — HOSPITAL ENCOUNTER (OUTPATIENT)
Dept: CT IMAGING | Age: 75
Discharge: HOME | End: 2020-07-29
Payer: MEDICARE

## 2020-07-27 PROCEDURE — 71250 CT THORAX DX C-: CPT

## 2020-08-03 ENCOUNTER — TELEPHONE (OUTPATIENT)
Dept: PULMONOLOGY | Age: 75
End: 2020-08-03

## 2020-09-02 ENCOUNTER — OFFICE VISIT (OUTPATIENT)
Dept: ORTHOPEDIC SURGERY | Age: 75
End: 2020-09-02
Payer: MEDICARE

## 2020-09-02 VITALS — HEIGHT: 64 IN | WEIGHT: 173 LBS | BODY MASS INDEX: 29.53 KG/M2 | TEMPERATURE: 97.2 F

## 2020-09-02 PROCEDURE — 20550 NJX 1 TENDON SHEATH/LIGAMENT: CPT | Performed by: ORTHOPAEDIC SURGERY

## 2020-09-02 PROCEDURE — 20610 DRAIN/INJ JOINT/BURSA W/O US: CPT | Performed by: ORTHOPAEDIC SURGERY

## 2020-09-02 PROCEDURE — 99214 OFFICE O/P EST MOD 30 MIN: CPT | Performed by: ORTHOPAEDIC SURGERY

## 2020-09-02 RX ORDER — LOSARTAN POTASSIUM 25 MG/1
TABLET ORAL
COMMUNITY
Start: 2020-07-22

## 2020-09-02 RX ORDER — TRIAMCINOLONE ACETONIDE 40 MG/ML
80 INJECTION, SUSPENSION INTRA-ARTICULAR; INTRAMUSCULAR ONCE
Status: COMPLETED | OUTPATIENT
Start: 2020-09-02 | End: 2020-09-02

## 2020-09-02 RX ORDER — BUPIVACAINE HYDROCHLORIDE 2.5 MG/ML
3 INJECTION, SOLUTION INFILTRATION; PERINEURAL ONCE
Status: COMPLETED | OUTPATIENT
Start: 2020-09-02 | End: 2020-09-02

## 2020-09-02 RX ORDER — CEFDINIR 300 MG/1
CAPSULE ORAL
COMMUNITY
Start: 2020-08-28

## 2020-09-02 RX ORDER — TRIAMCINOLONE ACETONIDE 40 MG/ML
20 INJECTION, SUSPENSION INTRA-ARTICULAR; INTRAMUSCULAR ONCE
Status: COMPLETED | OUTPATIENT
Start: 2020-09-02 | End: 2020-09-02

## 2020-09-02 RX ORDER — IBUPROFEN 200 MG
800 TABLET ORAL 2 TIMES DAILY
COMMUNITY

## 2020-09-02 RX ADMIN — BUPIVACAINE HYDROCHLORIDE 7.5 MG: 2.5 INJECTION, SOLUTION INFILTRATION; PERINEURAL at 16:01

## 2020-09-02 RX ADMIN — TRIAMCINOLONE ACETONIDE 20 MG: 40 INJECTION, SUSPENSION INTRA-ARTICULAR; INTRAMUSCULAR at 16:02

## 2020-09-02 RX ADMIN — TRIAMCINOLONE ACETONIDE 80 MG: 40 INJECTION, SUSPENSION INTRA-ARTICULAR; INTRAMUSCULAR at 16:02

## 2020-09-02 NOTE — PROGRESS NOTES
Chief Complaint:   Chief Complaint   Patient presents with    Knee Pain     Left knee pain. Requesting injection. Taking Ibuprofen 800 mg bid    Trigger finger     Left ring trigger finger. Would like injection        HPI   72-year-old woman with known history of left knee osteoarthritis. She did get improvement after her second steroid injection about 1 year ago. Did not tolerate diclofenac well but uses ibuprofen 800 mg twice daily. She is having recurring primarily medial aspect left knee pain and would like another steroid injection. Second complaint is recurrent left ring trigger finger. Was treated with a steroid injection and did very well for a year. She is not interested in surgical treatment yet. Patient Active Problem List   Diagnosis    Lung nodule    COPD (chronic obstructive pulmonary disease) ([facility])    Smoker    Abnormal radiograph    Snoring    Tinnitus of both ears       Past Medical History:   Diagnosis Date    Breast cyst     Patient states that she is well aware of the area. Patient states that it is a fluid filled cyst under the skin of her left breast that occasionally needs drained.     Chronic back pain     COPD (chronic obstructive pulmonary disease) ([facility])     DLCO 64%    Fibrocystic disease of breast     Hyperlipidemia     Hypertension     Lung nodule     alpha 1 negative    Pneumonia     6 min walk test 12-17-12 85% predicted percent    Shoulder arthritis     Syncope     Tinnitus of both ears 2/1/2018    Tobacco dependence        Past Surgical History:   Procedure Laterality Date    BREAST SURGERY      mass removal, benign    HYSTERECTOMY         Current Outpatient Medications   Medication Sig Dispense Refill    cefdinir (OMNICEF) 300 MG capsule TAKE ONE CAPSULE BY MOUTH EVERY 12 HOURS FOR 7 DAYS      losartan (COZAAR) 25 MG tablet TAKE ONE TABLET BY MOUTH EVERY DAY FOR 90 DAYS      ibuprofen (ADVIL;MOTRIN) 200 MG tablet Take 800 mg by mouth 2 times daily      metoprolol tartrate (LOPRESSOR) 25 MG tablet TAKE ONE TABLET BY MOUTH TWO TIMES A DAY  11    lisinopril (PRINIVIL;ZESTRIL) 20 MG tablet Take 20 mg by mouth daily      omeprazole (PRILOSEC) 40 MG capsule   Take 20 mg by mouth daily       atenolol (TENORMIN) 25 MG tablet Take 25 mg by mouth daily.  pravastatin (PRAVACHOL) 80 MG tablet Take 80 mg by mouth daily.  alprazolam (XANAX) 0.5 MG tablet   Take 0.5 mg by mouth 3 times daily       fish oil-omega-3 fatty acids 1000 MG capsule Take 1 g by mouth daily.  calcium-vitamin D (OSCAL-500) 500-200 MG-UNIT per tablet Take 1 tablet by mouth 2 times daily.  vitamin E 400 UNIT capsule Take 400 Units by mouth daily.  therapeutic multivitamin-minerals (THERAGRAN-M) tablet Take 1 tablet by mouth daily.  Ascorbic Acid (VITAMIN C) 500 MG tablet Take 500 mg by mouth daily.  aspirin 81 MG EC tablet   Take 81 mg by mouth 2 times daily       diphenhydrAMINE (BENADRYL) 25 MG tablet Take 25 mg by mouth nightly as needed.  diclofenac (VOLTAREN) 75 MG EC tablet Take 1 tablet by mouth 2 times daily (Patient not taking: Reported on 9/2/2020) 60 tablet 3    loratadine (CLARITIN) 10 MG tablet Take 10 mg by mouth daily. Current Facility-Administered Medications   Medication Dose Route Frequency Provider Last Rate Last Dose    triamcinolone acetonide (KENALOG-40) injection 80 mg  80 mg Intra-articular Once Jim Marcano MD        bupivacaine (MARCAINE) 0.25 % injection 7.5 mg  3 mL Intra-articular Once Jim Marcano MD        triamcinolone acetonide [facility]IA [facility]) injection 20 mg  20 mg Intra-articular Once Jim Marcano MD           Allergies   Allergen Reactions    Diclofenac Sodium Other (See Comments)     Sleepiness, weakness, fatigue, loss of appetite, profuse sweating    Celebrex [Celecoxib] Hives    Lipitor Hives       Social History     Socioeconomic History    Marital status:   Spouse name: None    Number of children: None    Years of education: None    Highest education level: None   Occupational History    None   Social Needs    Financial resource strain: None    Food insecurity     Worry: None     Inability: None    Transportation needs     Medical: None     Non-medical: None   Tobacco Use    Smoking status: Current Every Day Smoker     Packs/day: 1.00     Years: 50.00     Pack years: 50.00    Smokeless tobacco: Never Used    Tobacco comment: would like to quit    Substance and Sexual Activity    Alcohol use: Yes     Comment: socially    Drug use: No    Sexual activity: Never   Lifestyle    Physical activity     Days per week: None     Minutes per session: None    Stress: None   Relationships    Social connections     Talks on phone: None     Gets together: None     Attends Gnosticism service: None     Active member of club or organization: None     Attends meetings of clubs or organizations: None     Relationship status: None    Intimate partner violence     Fear of current or ex partner: None     Emotionally abused: None     Physically abused: None     Forced sexual activity: None   Other Topics Concern    None   Social History Narrative    None       Family History   Problem Relation Age of Onset    Heart Disease Mother     High Cholesterol Mother     Cancer Father     Arthritis Sister     High Blood Pressure Sister     High Cholesterol Sister     Cancer Brother     Heart Disease Brother     High Blood Pressure Brother     High Cholesterol Brother     Tuberculosis Brother          Review of Systems   No fever, chills, or other constitutionalsymptoms. No numbness or other neuro symptoms. No chest pain. No dyspnea. [unfilled]   Left knee varus. No effusion. Range of motion 0 to 130 degrees. No pain with left hip rotation. Left hand ring finger demonstrates triggering and pain at the A1 pulley level but full range of motion.     Physical Exam    Patient is alert and oriented. Well-developed well-nourished. BMI 30  Pupils equal and reactive. Scleraeanicteric. Neck supple  Lungs clear. Cardiac rate and rhythm regular. Abdomen soft and nontender. Skin warm and dry. XRAY:   Knee x-rays today bilateral standing AP with flexion weightbearing and lateral view of the left knee. Left knee shows stage IV medial compartment joint space loss with a varus. There is also significant patellofemoral degenerative narrowing and sclerosis on the lateral view. Right knee degenerative changes are moderate. Impression: Severe osteoarthritic changes left knee. ASSESSMENT/PLAN:    Abad Hernández was seen today for knee pain and trigger finger. Diagnoses and all orders for this visit:    Primary osteoarthritis of left knee  -     XR KNEE BILATERAL STANDING; Future  -     XR KNEE LEFT (1-2 VIEWS); Future  -     MI ARTHROCENTESIS ASPIR&/INJ MAJOR JT/BURSA W/O US    Chronic pain of left knee    Trigger ring finger of left hand  -     MI INJECT TENDON SHEATH/LIGAMENT    Other orders  -     triamcinolone acetonide (KENALOG-40) injection 80 mg  -     bupivacaine (MARCAINE) 0.25 % injection 7.5 mg  -     triamcinolone acetonide (KENALOG-40) injection 20 mg    Findings and images reviewed with the patient. Treatment options reviewed. Again she does not wish to consider surgery for the left hand or the left knee as yet. She request steroid injection for both locations. After review of indications, risks and limitations, after alcohol prep, left knee injection with triamcinolone 80mg and 3 cc 0.25% marcaine given today. Pt tolerated without complication. Left hand was prepped with alcohol and the flexor tendon sheath of the ring finger was injected without difficulty using 20 mg Kenalog. Well-tolerated by the patient. Return in about 3 months (around 12/2/2020).        Dean Galan MD    9/2/2020  2:03 PM

## 2020-10-05 ENCOUNTER — VIRTUAL VISIT (OUTPATIENT)
Dept: PULMONOLOGY | Age: 75
End: 2020-10-05
Payer: MEDICARE

## 2020-10-05 PROCEDURE — 99214 OFFICE O/P EST MOD 30 MIN: CPT | Performed by: INTERNAL MEDICINE

## 2020-10-05 NOTE — PROGRESS NOTES
mouth 2 times daily (Patient not taking: Reported on 2020) 60 tablet 3    metoprolol tartrate (LOPRESSOR) 25 MG tablet TAKE ONE TABLET BY MOUTH TWO TIMES A DAY  11    lisinopril (PRINIVIL;ZESTRIL) 20 MG tablet Take 20 mg by mouth daily      omeprazole (PRILOSEC) 40 MG capsule   Take 20 mg by mouth daily       atenolol (TENORMIN) 25 MG tablet Take 25 mg by mouth daily.  pravastatin (PRAVACHOL) 80 MG tablet Take 80 mg by mouth daily.  alprazolam (XANAX) 0.5 MG tablet   Take 0.5 mg by mouth 3 times daily       loratadine (CLARITIN) 10 MG tablet Take 10 mg by mouth daily.  fish oil-omega-3 fatty acids 1000 MG capsule Take 1 g by mouth daily.  calcium-vitamin D (OSCAL-500) 500-200 MG-UNIT per tablet Take 1 tablet by mouth 2 times daily.  vitamin E 400 UNIT capsule Take 400 Units by mouth daily.  therapeutic multivitamin-minerals (THERAGRAN-M) tablet Take 1 tablet by mouth daily.  Ascorbic Acid (VITAMIN C) 500 MG tablet Take 500 mg by mouth daily.  aspirin 81 MG EC tablet   Take 81 mg by mouth 2 times daily       diphenhydrAMINE (BENADRYL) 25 MG tablet Take 25 mg by mouth nightly as needed. No current facility-administered medications for this visit. SOCIAL AND OCCUPATIONAL HEALTH:  .   from small cell lung cancer. 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 She is a smoker for over 40 pack years. She has one dog at home. No birds. One brother who had TB as an infant. She travels frequently at least once a year; last time was a cruise (two months previously). Her hobbies include going to [employer]. She does at least once a month and to [place]. No asbestos or silica dust exposure. No history of recreational or IV drug use. No hot tub exposure. She likes to [employer] and is well at it. Goes to [facility].      SOCIAL HISTORY:   Social History     Tobacco Use    Smoking status: Current Every Day Smoker     Packs/day: 1.00     Years: 50.00     Pack years: 50.00    Smokeless tobacco: Never Used    Tobacco comment: would like to quit    Substance Use Topics    Alcohol use: Yes     Comment: socially    Drug use: No       SURGICAL HISTORY:   Past Surgical History:   Procedure Laterality Date    BREAST SURGERY      mass removal, benign    HYSTERECTOMY         FAMILY HISTORY: Mother  from myocardial infarction. Father  from lung cancer. One sibling  from leukemia and bladder cancer. One who had a stroke. Two daughters alive and well. One is present in the office today. Family History   Problem Relation Age of Onset    Heart Disease Mother     High Cholesterol Mother     Cancer Father     Arthritis Sister     High Blood Pressure Sister     High Cholesterol Sister     Cancer Brother     Heart Disease Brother     High Blood Pressure Brother     High Cholesterol Brother     Tuberculosis Brother       Family Status   Relation Name Status    Mother      Father      Sister  Alive    Brother  Alive        REVIEW OF SYSTEMS: The patients health assessment form was reviewed. Constitutional: General health is good. No weight changes. No fevers. No fatigue or weakness. Head: Patient has history of syncope. Skin: No history of changes in pigmentation. No easy bruising or bleeding. EENT: Patient denies any history of color blindness, inflammation, cataracts or glaucoma. Patient denies history of deafness, tinnitus, pain, discharge or recurrent infections. Patient denies history of rhinitis or nasal polyps. Lymphatic: Patient denies history of enlargement, inflammation or pain. Cardiovascular: History of palpations and chest pain. No ascites, rheumatic fever, cold extremities or edema. Respiration: Smokes. Patient denies wheezing or dyspnea. No hemoptysis, TB or asthma. No signs or symptoms of sleep apnea.  Rib fracture  Gastrointestinal: Patient denies changes in appetite. No dysphagia, odynophagia or abdominal pain. No hematochezia, melena or hemorrhoids. Colonscopy for IBS was negative 2010. Polyp, rectum, biopsy: Fragments of hyperplastic polyp. C.  Duodenum, biopsy: Duodenal mucosa with normal villous architecture, No significant pathologic abnormality identified. Genitourinary: Patient denies dysuria, frequency, urgency or incontinence. No history of stones. Musculoskeletal: History of arthritis or joint pains. Spinal > Lumber pain. Wrist fracture during move. Left knee OA with effusion received injections since (7/2019)  Breasts: Fibrocystic breast diease. Mamogram yearly negative. Neurological: Patient denies vertigo. Psychological: Anxiety. Endocrine: No polyuria or polyphagia. No diabetes. Vit D deficiency. Hematopoietic: No history of bleeding disorders or easy bruising. Rheumatic: OA > knees. Worsens. No polyarthritis or inflammatory joint disease. PHYSICAL EXAMINATION:   There were no vitals filed for this visit. Home Vitals given 130/85 mmHg. Pulse 83, Satuations 94%  Constitutional: This is a  70 y.o.  female  who is alert, oriented, cooperative and in no apparent distress. EENT: EOMI BARI  No conjunctival injections. External canals are patent and no discharge was appreciated. Septum was midline, mucosa was without erythema, exudates or cobblestoning. No thrush was noted. Neck: Supple without thyromegaly. No elevated JVP. Trachea was midline. No carotid bruits were auscultated. Respiratory: Symmetrical without dullness to percussion. Clear to auscultation. No wheezes, rhonchi or rales. No intercostal retraction or use of accessory muscles. No egophony noted. Cardiovascular: Regular without murmur, clicks, gallops or rubs. No left or right ventricular heave. Pulses:  Equal bilaterally. Abdomen: Obese, rounded and soft without organomegaly.  No rebound, rigidity or guarding was appreciated. Lymphatic: No lymphadenopathy. Musculoskeletal: Ambulate without assistance. Normal curvature of the spine. No gross muscle weakness. Muscle size, tone and strength are normal. No involuntary movements are noted. Extremities:  Trace edema. No ulcerations, tenderness or erythema. Deep tendon reflexes are normal. Left knee pain with edema  Skin:  Warm and dry. Neurological/Psychiatric: General behavior, level of consciousness, thought content and emotional status is normal.  Cranial nerves II-XII are intact. DATA:     Previous Spirometry demonstrates an FVC of 2.66 liters which is 96 % of predicted with an FEV1 of 2.23 liters which is 104 % of predicted. FEV1/FVC ratio is 84 %. Mid expiratory flow rates are 152 % of predicted. Maximum voluntary ventilation is 113 liters per minute or 127 % of predicted. Flow volume loop shows no signs of intrathoracic or extrathoracic process. Impression: Normal Spirometry    Static Lung Volume: reveal a slow vital capacity of 3.74 liters which is 115 % of predicted. The inspiratory capacity is 2.11 liters, 95% of predicted. The thoracic gas volume is 2.97 liters which is normal.  The total lung capacity is 5.08 liters, 98 % of predicted. The RV/TLC ratio is 64 % of predicted. The DlCO is 20.70 ml/min/mmHg which is 82% of predicted. The DlCO/VA (krogh constant) is 6.20 ml/min/mmHg, 120 % of predicted. CT SCAN CHEST [7/2020]:  Degenerative changes noted in the spine. Thoracic aorta is normal in caliber and mildly atherosclerotic. Stable nodules in the right upper lobe measuring up to 4 mm in size. Central airways are patent. No new nodule, pleural effusion or pneumothorax seen. Thyroid gland and esophagus are unremarkable. Heart is normal in size. No pericardial effusion. Colonic diverticulum with no evidence of diverticulitis. Visualized upper abdominal organs are otherwise unremarkable.  No axillary, mediastinal, hilar or supraclavicular lymphadenopathy seen. Impression - Stable    CT SCAN CHEST [8/2019] We reviewed the films during the inter-disciplinary rounds/conference, which showed degenerative changes noted in the spine. Thoracic aorta is normal in caliber and mildly atherosclerotic. Stable nodules in the right upper lobe measuring up to 4 mm in size. Central airways are patent. No new nodule, pleural effusion or pneumothorax seen. Thyroid gland and esophagus are unremarkable. Heart is normal in size. No pericardial effusion. Colonic diverticuli with no evidence of diverticulitis. Visualized upper abdominal organs are otherwise unremarkable. No axillary, mediastinal, hilar or supraclavicular lymphadenopathy seen. CHEST CT SCAN [8/2/2018]: Impression: Noted 2 previously documented nodules in the right upper lung are less conspicuous, representing a lung RADS category 2 (benign finding). There is a previously documented left breast mass of 2.3 cm diameter, which is not new, and may represent fibrocystic change. Mammographic  and/or ultrasound to better characterize this finding. CHEST CT SCAN [1/2018]: Pulmonary nodule (series 3, image 17), measuring approximately 0.55 cm x 0.44 cm, not seen on the previous exam. Pulmonary nodule (series 3, image 11), measuring approximately 0.3 cm, stable when compared with the previous exam. Left lung: No noncalcified pulmonary nodule or spiculated mass. No supraclavicular, axillary, or mediastinal lymphadenopathy. Hilar lymphadenopathy is difficult to ascertain given the lack of IV contrast administration. . A mass like lesion is noted projecting within the left breast soft tissues, measuring approximately 2.4 x 2.4 cm, partially excluded from the field-of-view. Finding was present on the previous exam. Visualized portions of the thyroid, heart, esophagus, stomach, liver, gallbladder, spleen, adrenals, kidneys are unremarkable. Pancreas is unremarkable. No lytic or blastic bony lesions. CHEST CT SCAN [2017] Impression: Stable right apical pulmonary nodule as described above. Recommend follow up as per Fleischner criteria below. 2. No focal consolidation, pleural effusion or pneumothorax. 3. Cystic mass lesion in the subcutaneous soft tissues of the anterior left chest wall.  (she is following for the cyst [facility])    CHEST CT SCAN [2016] Pulmonary nodule (series 3, image 11), measuring approximately 0.31 cm, enlarged since previous exam at which time it measured approximately 0.18 cm. 2. Pulmonary nodule (series 3, image 20), measuring approximately 0.27 cm, not seen on previous exam.  Left lun. No noncalcified pulmonary nodule or spiculated mass. No supraclavicular, mediastinal, or hilar lymphadenopathy is identified. Bilateral axillary lymphadenopathy, on the left measure approximately 1.4 x 1.7 cm and on the right measuring approximately  1.2 x 1.4 cm. CHEST CT SCAN [2015] There are nodular opacities within the left breast unchanged. The adrenal glands appear normal. There are again spinal degenerative changes. Since , there is bi apical pleural parenchymal scars. Again no discrete pulmonary nodule is seen. Scars changes at the medial aspect of the right middle lobe and into the lingula peripherally. CHEST CT SCAN [2014] There are nodular opacities within the left breast one posteriorly and centrally 8.5 mm and the other centrally and more peripherally 1.5 cm. The adrenal glands appear normal. There are again spinal degenerative changes. Since  and  again seen are bi apical pleural parenchymal scars. No discrete pulmonary nodule is seen. Again seen are scars into the medial aspect of the right middle lobe and into the lingula peripherally. There is stable band of ground glass density into the periphery of the left upper lobe.     CHEST CT SCAN [2013] Films were read/reviewed/discussed with radiology and shows there are increased interstitial markings on today's examination and this may represent dependent atelectasis. No other mass nodule or infiltrate is identified. The mediastinum fails to demonstrate pathologic adenopathy. There is no hilar adenopathy. There is no axillary beth disease. The adrenal glands are not enlarged. There are no bony abnormalities. Impression: stable appearing right upper lobe lung pulmonary nodule. There are no acute changes or new findings     SIX MINUTE WALK TEST: INTERPRETATION:2017  Using standard reference equation for the six minute walk test [6MWT], the patient is at 85 % of predicted. IMPRESSION:    Teddy Engel is a 70 y.o. female with  right lung nodules 0.4 mm with mild COPD stable in a active smoker. New complaints of OA pain and red tongue. With this in mind, we would like to proceed with the following;                PLAN:    I told her I was concerned about the red tongue and the pain she is having it did not appear that the dentist saw much although still put her in an antibiotic. I asked her to take Biotene only Mylanta gargles and nystatin for 10 days and if not better to seek her primary for evaluation of her tongue. As for her pulmonary disease I asked her to abstain from smoking. We have followed Armida Ramirez lung nodule for > 9 years and continue to follow because of her family history and smoking. She is to follow up in  6 months for COPD and we will now go yearly on the CT scan since she continues to smoke. We again discussed the smoking cessation program. Health maintenance screening evaluation such as breast and colonoscopy are normal and up-to-date. Vaccines were discussed and she will come in a month for her influenza shot in addition we ordered B12 and reviewed her labs. As mentioned above medication was called to pharmacy. We hope this updates you on our evaluation and clinical thinking. We discussed her new  she met online dating's site.  Thank you for entrusting us to participate in [facility]. Sincerely,      Yaima Bateman D.O., MPH, [facility]  Professor of Internal Medicine  Director, [id number] is a 70 y.o. female evaluated via telephone on 10/5/2020. Consent:  She and/or health care decision maker is aware that that she may receive a bill for this telephone service, depending on her insurance coverage, and has provided verbal consent to proceed: Yes      Documentation:  I communicated with the patient and/or health care decision maker about Imaging results and COPD . Details of this discussion including any medical advice provided. I affirm this is a Patient Initiated Episode with a Patient who has not had a related appointment within my department in the past 7 days or scheduled within the next 24 hours. Patient identification was verified at the start of the visit: Yes`    Total Time: 5-10 minutes.     Note: not billable if this call serves to triage the patient into an appointment for the relevant concern      Stefanie Nelson

## 2020-12-03 ENCOUNTER — OFFICE VISIT (OUTPATIENT)
Dept: ORTHOPEDIC SURGERY | Age: 75
End: 2020-12-03
Payer: MEDICARE

## 2020-12-03 VITALS — HEIGHT: 64 IN | BODY MASS INDEX: 29.53 KG/M2 | WEIGHT: 173 LBS | TEMPERATURE: 96.6 F

## 2020-12-03 PROCEDURE — 99213 OFFICE O/P EST LOW 20 MIN: CPT | Performed by: ORTHOPAEDIC SURGERY

## 2020-12-03 NOTE — PROGRESS NOTES
like to quit    Substance and Sexual Activity    Alcohol use: Yes     Comment: socially    Drug use: No    Sexual activity: Never   Lifestyle    Physical activity     Days per week: None     Minutes per session: None    Stress: None   Relationships    Social connections     Talks on phone: None     Gets together: None     Attends Jew service: None     Active member of club or organization: None     Attends meetings of clubs or organizations: None     Relationship status: None    Intimate partner violence     Fear of current or ex partner: None     Emotionally abused: None     Physically abused: None     Forced sexual activity: None   Other Topics Concern    None   Social History Narrative    None       Family History   Problem Relation Age of Onset    Heart Disease Mother     High Cholesterol Mother     Cancer Father     Arthritis Sister     High Blood Pressure Sister     High Cholesterol Sister     Cancer Brother     Heart Disease Brother     High Blood Pressure Brother     High Cholesterol Brother     Tuberculosis Brother          Review of Systems   No fever, chills, or other constitutionalsymptoms. No numbness or other neuro symptoms. No chest pain. No dyspnea. [unfilled]   Left hand exam demonstrates full range of motion of all digits without pain or locking. Left knee has significant varus. No acute effusion. Full range of motion left knee. Stable varus valgus. No pain with left hip rotation. Physical Exam    Patient is alert and oriented. Well-developed well-nourished. BMI 30. Pupils equal and reactive. Scleraeanicteric. Neck supple  Lungs clear. Cardiac rate and rhythm regular. Abdomen soft and nontender. Skin warm and dry. XRAY: Recent knee x-rays reviewed. Stage IV medial compartment and anterior compartment degenerative change with varus. Right knee less involved.                     ASSESSMENT/PLAN:    Kae Rivera was seen today for knee pain and trigger finger. Diagnoses and all orders for this visit:    Primary osteoarthritis of left knee  -     Ambulatory referral to Physical Therapy    Options discussed. Since she had limited relief from prior steroid injection left knee and is thinking about knee replacement possibly in the spring she decides to defer knee injections today. Reviewed topical analgesics including trial of Voltaren gel. Referred to PT to instruct in appropriate strengthening exercises for management of her knee arthritis now and to prepare her for eventual knee replacement surgery. Information packet regarding knee replacement surgery given to her today. She will call if she decides on timing for knee replacement surgery otherwise 3 months. Return in about 3 months (around 3/3/2021).        Gregorio Nixon MD    12/3/2020  9:42 AM

## 2021-02-01 DIAGNOSIS — J44.0 CHRONIC OBSTRUCTIVE PULMONARY DISEASE WITH ACUTE LOWER RESPIRATORY INFECTION (HCC): ICD-10-CM

## 2021-02-26 ENCOUNTER — HOSPITAL ENCOUNTER (OUTPATIENT)
Dept: GENERAL RADIOLOGY | Age: 76
Discharge: HOME | End: 2021-02-28
Payer: MEDICARE

## 2021-02-26 DIAGNOSIS — R92.8 BI-RADS CATEGORY 3 MAMMOGRAM RESULT: ICD-10-CM

## 2021-02-26 DIAGNOSIS — N60.02 CYST OF LEFT BREAST: ICD-10-CM

## 2021-02-26 DIAGNOSIS — N60.02 BREAST CYST, LEFT: ICD-10-CM

## 2021-02-26 PROCEDURE — 77066 DX MAMMO INCL CAD BI: CPT

## 2021-02-26 PROCEDURE — 76642 ULTRASOUND BREAST LIMITED: CPT

## 2021-05-18 ENCOUNTER — VIRTUAL VISIT (OUTPATIENT)
Dept: PULMONOLOGY | Age: 76
End: 2021-05-18
Payer: MEDICARE

## 2021-05-18 DIAGNOSIS — R91.1 LUNG NODULE: Primary | ICD-10-CM

## 2021-05-18 PROCEDURE — 99442 PR PHYS/QHP TELEPHONE EVALUATION 11-20 MIN: CPT | Performed by: INTERNAL MEDICINE

## 2021-05-18 NOTE — PROGRESS NOTES
[place]  Department of Pulmonary, Critical Care and Sleep Medicine  [address] [facility]  Department of Internal Medicine  Daily Progress Note    Manda Mccullough DO, Marianela Estevezand, [address] [facility]  Donald Frye MD, [facility]    Dear Graham Cheng DO    We had the pleasure of seeing your patient, Maddie Gupta, in the [address] regarding her multiple lung nodules & tobacco abuse. HISTORY OF PRESENT ILLNESS:    Maddie Gupta is a 70 y.o. female with hyperlipidemia, hypertension, tobacco abuse, lung nodule, history of syncope on beta blockers, fibrocystic breast disease and chronic pain. 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 She was on Chantix before and actually did quite well and was able to reduce her tobacco use but stopped it due to one fleeting episode that occurred while driving. Her history goes back to July, when she developed muscle pain; diffusely. Initially thought it was a bladder infection and started on Ciprofloxacin. Unfortunately, Ciprofloxacin made symptoms worse. She went back to see you, at that point, you thought it was the Lipitor and medication was stopped. She was given a Medrol dose pack and some Darvocet with an improvement. Throughout all of this, she continued to smoke and for completeness, she underwent a CT scan of the chest, abdomen and pelvis of which reported a 2- 3 mm nodule in the right upper lobe, normal abdomen and pelvis and an asymmetrical 1.7 mm soft tissue density in the left breast which I think is well known. We also discussed her smoking in detail and recommended an updated TB skin test. (Because she has a family history of a brother having TB and was sent to the [facility]) which was negative. On today phone visit, She denies chest pain, shortness of breath or hemoptysis.   She has no fever, chills, chest pain, pleurisy, cough or hemoptysis. We get CT scans (yearly) but missed the one on 4/2021. Her old her showed a  pulmonary nodules which are stable (2020) when compared with the previous exam.  We discussed smoking sensation. She has now developed chronic cough with some mucous consistent with her COPD. ALLERGIES:    Allergies   Allergen Reactions    Diclofenac Sodium Other (See Comments)     Sleepiness, weakness, fatigue, loss of appetite, profuse sweating    Celebrex [Celecoxib] Hives    Lipitor Hives       PAST MEDICAL HISTORY:       Diagnosis Date    Breast cyst     Patient states that she is well aware of the area. Patient states that it is a fluid filled cyst under the skin of her left breast that occasionally needs drained.  Chronic back pain     COPD (chronic obstructive pulmonary disease) ([facility])     DLCO 64%    Fibrocystic disease of breast     Hyperlipidemia     Hypertension     Lung nodule     alpha 1 negative    Pneumonia     6 min walk test 12-17-12 85% predicted percent    Shoulder arthritis     Syncope     Tinnitus of both ears 2/1/2018    Tobacco dependence         MEDICATIONS:   Current Outpatient Medications   Medication Sig Dispense Refill    nystatin (MYCOSTATIN) 693855 UNIT/ML suspension Take 5 mLs by mouth 4 times daily 200 mL 1    cefdinir (OMNICEF) 300 MG capsule TAKE ONE CAPSULE BY MOUTH EVERY 12 HOURS FOR 7 DAYS      losartan (COZAAR) 25 MG tablet TAKE ONE TABLET BY MOUTH EVERY DAY FOR 90 DAYS      ibuprofen (ADVIL;MOTRIN) 200 MG tablet Take 800 mg by mouth 2 times daily      diclofenac (VOLTAREN) 75 MG EC tablet Take 1 tablet by mouth 2 times daily 60 tablet 3    metoprolol tartrate (LOPRESSOR) 25 MG tablet TAKE ONE TABLET BY MOUTH TWO TIMES A DAY  11    lisinopril (PRINIVIL;ZESTRIL) 20 MG tablet Take 20 mg by mouth daily      omeprazole (PRILOSEC) 40 MG capsule   Take 20 mg by mouth daily       atenolol (TENORMIN) 25 MG tablet Take 25 mg by mouth daily.         pravastatin (PRAVACHOL) 80 MG tablet Take 80 mg by mouth daily.  alprazolam (XANAX) 0.5 MG tablet   Take 0.5 mg by mouth 3 times daily       loratadine (CLARITIN) 10 MG tablet Take 10 mg by mouth daily.  fish oil-omega-3 fatty acids 1000 MG capsule Take 1 g by mouth daily.  calcium-vitamin D (OSCAL-500) 500-200 MG-UNIT per tablet Take 1 tablet by mouth 2 times daily.  vitamin E 400 UNIT capsule Take 400 Units by mouth daily.  therapeutic multivitamin-minerals (THERAGRAN-M) tablet Take 1 tablet by mouth daily.  Ascorbic Acid (VITAMIN C) 500 MG tablet Take 500 mg by mouth daily.  aspirin 81 MG EC tablet   Take 81 mg by mouth 2 times daily       diphenhydrAMINE (BENADRYL) 25 MG tablet Take 25 mg by mouth nightly as needed. No current facility-administered medications for this visit. SOCIAL AND OCCUPATIONAL HEALTH:  .   from small cell lung cancer. 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 She is a smoker for over 40 pack years. She has one dog at home. No birds. One brother who had TB as an infant. She travels frequently at least once a year; last time was a cruise (two months previously). Her hobbies include going to [employer]. She does at least once a month and to [place]. No asbestos or silica dust exposure. No history of recreational or IV drug use. No hot tub exposure. She likes to [employer] and is well at it. Goes to [facility].      SOCIAL HISTORY:   Social History     Tobacco Use    Smoking status: Current Every Day Smoker     Packs/day: 1.00     Years: 50.00     Pack years: 50.00    Smokeless tobacco: Never Used    Tobacco comment: would like to quit    Substance Use Topics    Alcohol use: Yes     Comment: socially    Drug use: No       SURGICAL HISTORY:   Past Surgical History:   Procedure Laterality Date    BREAST SURGERY      mass removal, benign    HYSTERECTOMY FAMILY HISTORY: Mother  from myocardial infarction. Father  from lung cancer. One sibling  from leukemia and bladder cancer. One who had a stroke. Two daughters alive and well. One is present in the office today. Family History   Problem Relation Age of Onset    Heart Disease Mother     High Cholesterol Mother     Cancer Father     Arthritis Sister     High Blood Pressure Sister     High Cholesterol Sister     Cancer Brother     Heart Disease Brother     High Blood Pressure Brother     High Cholesterol Brother     Tuberculosis Brother       Family Status   Relation Name Status    Mother      Father      Sister  Alive    Brother  Alive        REVIEW OF SYSTEMS: The patients health assessment form was reviewed. Constitutional: General health is good. No weight changes. No fevers. No fatigue or weakness. Head: Patient has history of syncope. Skin: No history of changes in pigmentation. No easy bruising or bleeding. EENT: Patient denies any history of color blindness, inflammation, cataracts or glaucoma. Patient denies history of deafness, tinnitus, pain, discharge or recurrent infections. Patient denies history of rhinitis or nasal polyps. Lymphatic: Patient denies history of enlargement, inflammation or pain. Cardiovascular: History of palpations and chest pain. No ascites, rheumatic fever, cold extremities or edema. Respiration: Smokes. Patient denies wheezing or dyspnea. No hemoptysis, TB or asthma. No signs or symptoms of sleep apnea. Rib fracture  Gastrointestinal: Patient denies changes in appetite. No dysphagia, odynophagia or abdominal pain. No hematochezia, melena or hemorrhoids. Colonscopy for IBS was negative . Polyp, rectum, biopsy: Fragments of hyperplastic polyp. C.  Duodenum, biopsy: Duodenal mucosa with normal villous architecture, No significant pathologic abnormality identified.   Genitourinary: Patient denies dysuria, frequency, urgency or incontinence. No history of stones. Musculoskeletal: History of arthritis or joint pains. Spinal > Lumber pain. Wrist fracture during move. Left knee OA with effusion received injections since (7/2019)  Breasts: Fibrocystic breast diease. Mamogram yearly negative. Neurological: Patient denies vertigo. Psychological: Anxiety. Endocrine: No polyuria or polyphagia. No diabetes. Vit D deficiency. Hematopoietic: No history of bleeding disorders or easy bruising. Rheumatic: OA > knees. Worsens. No polyarthritis or inflammatory joint disease. PHYSICAL EXAMINATION:   There were no vitals filed for this visit. Home Vitals given 130/85 mmHg. Pulse 83, Satuations 94%  Constitutional: This is a  70 y.o.  female  who is alert, oriented, cooperative and in no apparent distress. EENT: EOMI BARI  No conjunctival injections. External canals are patent and no discharge was appreciated. Septum was midline, mucosa was without erythema, exudates or cobblestoning. No thrush was noted. Neck: Supple without thyromegaly. No elevated JVP. Trachea was midline. No carotid bruits were auscultated. Respiratory: Symmetrical without dullness to percussion. Clear to auscultation. No wheezes, rhonchi or rales. No intercostal retraction or use of accessory muscles. No egophony noted. Cardiovascular: Regular without murmur, clicks, gallops or rubs. No left or right ventricular heave. Pulses:  Equal bilaterally. Abdomen: Obese, rounded and soft without organomegaly. No rebound, rigidity or guarding was appreciated. Lymphatic: No lymphadenopathy. Musculoskeletal: Ambulate without assistance. Normal curvature of the spine. No gross muscle weakness. Muscle size, tone and strength are normal. No involuntary movements are noted. Extremities:  Trace edema. No ulcerations, tenderness or erythema. Deep tendon reflexes are normal. Left knee pain with edema  Skin:  Warm and dry.     Neurological/Psychiatric: General behavior, level of consciousness, thought content and emotional status is normal.  Cranial nerves II-XII are intact. DATA:     Previous Spirometry demonstrates an FVC of 2.66 liters which is 96 % of predicted with an FEV1 of 2.23 liters which is 104 % of predicted. FEV1/FVC ratio is 84 %. Mid expiratory flow rates are 152 % of predicted. Maximum voluntary ventilation is 113 liters per minute or 127 % of predicted. Flow volume loop shows no signs of intrathoracic or extrathoracic process. Impression: Normal Spirometry    Static Lung Volume: reveal a slow vital capacity of 3.74 liters which is 115 % of predicted. The inspiratory capacity is 2.11 liters, 95% of predicted. The thoracic gas volume is 2.97 liters which is normal.  The total lung capacity is 5.08 liters, 98 % of predicted. The RV/TLC ratio is 64 % of predicted. The DlCO is 20.70 ml/min/mmHg which is 82% of predicted. The DlCO/VA (krogh constant) is 6.20 ml/min/mmHg, 120 % of predicted. CT SCAN CHEST [7/2020]:  Degenerative changes noted in the spine. Thoracic aorta is normal in caliber and mildly atherosclerotic. Stable nodules in the right upper lobe measuring up to 4 mm in size. Central airways are patent. No new nodule, pleural effusion or pneumothorax seen. Thyroid gland and esophagus are unremarkable. Heart is normal in size. No pericardial effusion. Colonic diverticulum with no evidence of diverticulitis. Visualized upper abdominal organs are otherwise unremarkable. No axillary, mediastinal, hilar or supraclavicular lymphadenopathy seen. Impression - Stable    CT SCAN CHEST [8/2019] We reviewed the films during the inter-disciplinary rounds/conference, which showed degenerative changes noted in the spine. Thoracic aorta is normal in caliber and mildly atherosclerotic. Stable nodules in the right upper lobe measuring up to 4 mm in size. Central airways are patent. No new nodule, pleural effusion or pneumothorax seen.  Thyroid gland and esophagus are unremarkable. Heart is normal in size. No pericardial effusion. Colonic diverticuli with no evidence of diverticulitis. Visualized upper abdominal organs are otherwise unremarkable. No axillary, mediastinal, hilar or supraclavicular lymphadenopathy seen. CHEST CT SCAN [8/2/2018]: Impression: Noted 2 previously documented nodules in the right upper lung are less conspicuous, representing a lung RADS category 2 (benign finding). There is a previously documented left breast mass of 2.3 cm diameter, which is not new, and may represent fibrocystic change. Mammographic  and/or ultrasound to better characterize this finding. CHEST CT SCAN [1/2018]: Pulmonary nodule (series 3, image 17), measuring approximately 0.55 cm x 0.44 cm, not seen on the previous exam. Pulmonary nodule (series 3, image 11), measuring approximately 0.3 cm, stable when compared with the previous exam. Left lung: No noncalcified pulmonary nodule or spiculated mass. No supraclavicular, axillary, or mediastinal lymphadenopathy. Hilar lymphadenopathy is difficult to ascertain given the lack of IV contrast administration. . A mass like lesion is noted projecting within the left breast soft tissues, measuring approximately 2.4 x 2.4 cm, partially excluded from the field-of-view. Finding was present on the previous exam. Visualized portions of the thyroid, heart, esophagus, stomach, liver, gallbladder, spleen, adrenals, kidneys are unremarkable. Pancreas is unremarkable. No lytic or blastic bony lesions. CHEST CT SCAN [1/2017] Impression: Stable right apical pulmonary nodule as described above. Recommend follow up as per Fleischner criteria below. 2. No focal consolidation, pleural effusion or pneumothorax.  3. Cystic mass lesion in the subcutaneous soft tissues of the anterior left chest wall.  (she is following for the cyst in [facility])    CHEST CT SCAN [7/2016] Pulmonary nodule (series 3, image 11), measuring approximately 0.31 cm, enlarged since previous exam at which time it measured approximately 0.18 cm. 2. Pulmonary nodule (series 3, image 20), measuring approximately 0.27 cm, not seen on previous exam.  Left lun. No noncalcified pulmonary nodule or spiculated mass. No supraclavicular, mediastinal, or hilar lymphadenopathy is identified. Bilateral axillary lymphadenopathy, on the left measure approximately 1.4 x 1.7 cm and on the right measuring approximately  1.2 x 1.4 cm. CHEST CT SCAN [2015] There are nodular opacities within the left breast unchanged. The adrenal glands appear normal. There are again spinal degenerative changes. Since , there is bi apical pleural parenchymal scars. Again no discrete pulmonary nodule is seen. Scars changes at the medial aspect of the right middle lobe and into the lingula peripherally. CHEST CT SCAN [2014] There are nodular opacities within the left breast one posteriorly and centrally 8.5 mm and the other centrally and more peripherally 1.5 cm. The adrenal glands appear normal. There are again spinal degenerative changes. Since  and  again seen are bi apical pleural parenchymal scars. No discrete pulmonary nodule is seen. Again seen are scars into the medial aspect of the right middle lobe and into the lingula peripherally. There is stable band of ground glass density into the periphery of the left upper lobe. CHEST CT SCAN [2013] Films were read/reviewed/discussed with radiology and shows there are increased interstitial markings on today's examination and this may represent dependent atelectasis. No other mass nodule or infiltrate is identified. The mediastinum fails to demonstrate pathologic adenopathy. There is no hilar adenopathy. There is no axillary beth disease. The adrenal glands are not enlarged. There are no bony abnormalities. Impression: stable appearing right upper lobe lung pulmonary nodule.  There are no acute changes or new findings     SIX

## 2021-09-13 ENCOUNTER — TELEPHONE (OUTPATIENT)
Dept: PULMONOLOGY | Age: 76
End: 2021-09-13

## 2021-09-13 NOTE — TELEPHONE ENCOUNTER
Mailed a letter to patient informing her that her CT Lung Screening is scheduled for 10-15-21 at 11:00 am at the [facility]. She must arrive by 10:30 am. There is no prep for this test

## 2021-10-15 ENCOUNTER — HOSPITAL ENCOUNTER (OUTPATIENT)
Dept: CT IMAGING | Age: 76
Discharge: HOME | End: 2021-10-17
Payer: MEDICARE

## 2021-10-15 DIAGNOSIS — R91.1 LUNG NODULE: ICD-10-CM

## 2021-10-15 PROCEDURE — 71271 CT THORAX LUNG CANCER SCR C-: CPT

## 2021-10-18 ENCOUNTER — TELEPHONE (OUTPATIENT)
Dept: CASE MANAGEMENT | Age: 76
End: 2021-10-18

## 2021-10-18 NOTE — TELEPHONE ENCOUNTER
No call, encounter opened to process CT Lung Screening. CT Lung Screen: 10/15/2021    Impression   No pulmonary nodules.       Interval decrease in size of left breast soft tissue lesion now measuring 1.5   cm.  Findings likely benign.  Further evaluation may be considered with   breast ultrasound if clinically warranted.       LUNG RADS:   Per ACR Lung-RADS Version 1.1       Lung rads 1.  Continue annual low-dose CT screening of the chest.       RECOMMENDATIONS:   If you would like to register your patient with the [email or web address], please contact the Nurse Navigator at   [phone]. Pack years: 48    Social History     Tobacco Use  Smoking Status: Current Every Day Smoker    Start Date:    Quit Date:    Types: Cigarettes   Packs/Day: 1   Years: 48   Pack Years: 48   Smokeless Tobacco: Never used         Results letter sent to patient via my chart or mailed.      [address] [facility]

## 2021-12-10 ENCOUNTER — VIRTUAL VISIT (OUTPATIENT)
Dept: PULMONOLOGY | Age: 76
End: 2021-12-10
Payer: MEDICARE

## 2021-12-10 DIAGNOSIS — J44.9 CHRONIC OBSTRUCTIVE PULMONARY DISEASE, UNSPECIFIED COPD TYPE (HCC): Primary | ICD-10-CM

## 2021-12-10 DIAGNOSIS — R91.1 LUNG NODULE: ICD-10-CM

## 2021-12-10 PROCEDURE — 99441 PR PHYS/QHP TELEPHONE EVALUATION 5-10 MIN: CPT | Performed by: INTERNAL MEDICINE

## 2021-12-13 DIAGNOSIS — R91.1 LUNG NODULE: Primary | ICD-10-CM

## 2022-11-17 ENCOUNTER — HOSPITAL ENCOUNTER (OUTPATIENT)
Dept: CT IMAGING | Age: 77
Discharge: HOME | End: 2022-11-19
Payer: MEDICARE

## 2022-11-17 DIAGNOSIS — R91.1 LUNG NODULE: ICD-10-CM

## 2022-11-17 PROCEDURE — 71250 CT THORAX DX C-: CPT

## 2022-12-22 ENCOUNTER — OFFICE VISIT (OUTPATIENT)
Dept: PULMONOLOGY | Age: 77
End: 2022-12-22
Payer: MEDICARE

## 2022-12-22 VITALS
SYSTOLIC BLOOD PRESSURE: 137 MMHG | OXYGEN SATURATION: 96 % | RESPIRATION RATE: 18 BRPM | HEART RATE: 84 BPM | BODY MASS INDEX: 29.88 KG/M2 | TEMPERATURE: 96.9 F | HEIGHT: 64 IN | DIASTOLIC BLOOD PRESSURE: 98 MMHG | WEIGHT: 175 LBS

## 2022-12-22 DIAGNOSIS — J44.9 CHRONIC OBSTRUCTIVE PULMONARY DISEASE, UNSPECIFIED COPD TYPE (HCC): Primary | ICD-10-CM

## 2022-12-22 LAB
EXPIRATORY TIME: NORMAL
FEF 25-75% %PRED-PRE: NORMAL
FEF 25-75% PRED: NORMAL
FEF 25-75%-PRE: NORMAL
FEV1 %PRED-PRE: 105 %
FEV1 PRED: 2.06 L
FEV1/FVC %PRED-PRE: 109 %
FEV1/FVC PRED: 78 %
FEV1/FVC: 86 %
FEV1: 2.17 L
FVC %PRED-PRE: 94 %
FVC PRED: 2.68 L
FVC: 2.53 L
PEF %PRED-PRE: NORMAL
PEF PRED: NORMAL
PEF-PRE: NORMAL

## 2022-12-22 PROCEDURE — 94010 BREATHING CAPACITY TEST: CPT | Performed by: INTERNAL MEDICINE

## 2022-12-22 PROCEDURE — 99213 OFFICE O/P EST LOW 20 MIN: CPT | Performed by: INTERNAL MEDICINE

## 2022-12-22 RX ORDER — ALBUTEROL SULFATE 90 UG/1
2 AEROSOL, METERED RESPIRATORY (INHALATION) EVERY 4 HOURS PRN
Qty: 18 G | Refills: 6 | Status: SHIPPED | OUTPATIENT
Start: 2022-12-22 | End: 2023-01-21

## 2022-12-22 ASSESSMENT — PULMONARY FUNCTION TESTS
FEV1/FVC_PERCENT_PREDICTED_PRE: 109
FEV1_PERCENT_PREDICTED_PRE: 105
FEV1/FVC: 86
FEV1: 2.17
FEV1/FVC_PREDICTED: 78
FVC_PERCENT_PREDICTED_PRE: 94
FEV1_PREDICTED: 2.06
FVC_PREDICTED: 2.68
FVC: 2.53

## 2022-12-22 NOTE — PROGRESS NOTES
[place]  Department of Pulmonary, Critical Care and Sleep Medicine  [address] [facility]  Department of Internal Medicine  Daily Progress Note    Scarlett Barron DO, MPH, [facility], FACOI, [facility]  Ese ELLIOTT, [facility]  Patito Portillo MD, MS  Caitlyn Leon, APRN-CNP      Dear Kory Miles DO    We had the pleasure of doing a phone visit on your patient, Jair Rivera, in the [address] regarding her multiple lung nodules & tobacco abuse. HISTORY OF PRESENT ILLNESS:    Jair Rivera is a 78 y.o. female with hyperlipidemia, hypertension, tobacco abuse, lung nodule, history of syncope on beta blockers, fibrocystic breast disease and chronic pain. 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 She was on Chantix before and actually did quite well and was able to reduce her tobacco use but stopped it due to one fleeting episode that occurred while driving. Her history goes back to July, when she developed muscle pain; diffusely. Initially thought it was a bladder infection and started on Ciprofloxacin. Unfortunately, Ciprofloxacin made symptoms worse. She went back to see you, at that point, you thought it was the Lipitor and medication was stopped. She was given a Medrol dose pack and some Darvocet with an improvement. Throughout all of this, she continued to smoke and for completeness, she underwent a CT scan of the chest, abdomen and pelvis of which reported a 2- 3 mm nodule in the right upper lobe, normal abdomen and pelvis and an asymmetrical 1.7 mm soft tissue density in the left breast which I think is well known. We also discussed her smoking in detail and recommended an updated TB skin test. (Because she has a family history of a brother having TB and was sent to the [facility]) which was negative.      On today visit, She denies chest pain, shortness of breath or hemoptysis. She has no fever, chills, chest pain, pleurisy, cough or hemoptysis. We get CT scans (yearly) showed no nodule. Her old her showed a  pulmonary nodules which are stable (2020) when compared with the previous exam.  We discussed smoking sensation. CT scan was stable 2022. ALLERGIES:    Allergies   Allergen Reactions    Diclofenac Sodium Other (See Comments)     Sleepiness, weakness, fatigue, loss of appetite, profuse sweating    Celebrex [Celecoxib] Hives    Lipitor Hives       PAST MEDICAL HISTORY:       Diagnosis Date    Breast cyst     Patient states that she is well aware of the area. Patient states that it is a fluid filled cyst under the skin of her left breast that occasionally needs drained. Chronic back pain     COPD (chronic obstructive pulmonary disease) (HCC)     DLCO 64%    Fibrocystic disease of breast     Hyperlipidemia     Hypertension     Lung nodule     alpha 1 negative    Pneumonia     6 min walk test 12-17-12 85% predicted percent    Shoulder arthritis     Syncope     Tinnitus of both ears 2/1/2018    Tobacco dependence         MEDICATIONS:   Current Outpatient Medications   Medication Sig Dispense Refill    nystatin (MYCOSTATIN) 407544 UNIT/ML suspension Take 5 mLs by mouth 4 times daily 200 mL 1    losartan (COZAAR) 25 MG tablet TAKE ONE TABLET BY MOUTH EVERY DAY FOR 90 DAYS      diclofenac (VOLTAREN) 75 MG EC tablet Take 1 tablet by mouth 2 times daily 60 tablet 3    metoprolol tartrate (LOPRESSOR) 25 MG tablet TAKE ONE TABLET BY MOUTH TWO TIMES A DAY  11    lisinopril (PRINIVIL;ZESTRIL) 20 MG tablet Take 20 mg by mouth daily      omeprazole (PRILOSEC) 40 MG capsule   Take 20 mg by mouth daily       pravastatin (PRAVACHOL) 80 MG tablet Take 80 mg by mouth daily. alprazolam (XANAX) 0.5 MG tablet   Take 0.5 mg by mouth 3 times daily       loratadine (CLARITIN) 10 MG tablet Take 10 mg by mouth daily.         fish oil-omega-3 fatty acids 1000 MG capsule Take 1 g by mouth daily.      therapeutic multivitamin-minerals (THERAGRAN-M) tablet Take 1 tablet by mouth daily. Ascorbic Acid (VITAMIN C) 500 MG tablet Take 500 mg by mouth daily. aspirin 81 MG EC tablet   Take 81 mg by mouth 2 times daily       diphenhydrAMINE (BENADRYL) 25 MG tablet Take 25 mg by mouth nightly as needed. No current facility-administered medications for this visit. SOCIAL AND OCCUPATIONAL HEALTH:  .   from small cell lung cancer. 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 She is a smoker for over 40 pack years. She has one dog at home. No birds. One brother who had TB as an infant. She travels frequently at least once a year; last time was a cruise (two months previously). Her hobbies include going to [employer]. She does at least once a month and to [place]. No asbestos or silica dust exposure. No history of recreational or IV drug use. No hot tub exposure. She likes to [employer] and is well at it. Goes to [facility] [employer] [facility]. SOCIAL HISTORY:   Social History     Tobacco Use    Smoking status: Every Day     Packs/day: 1.00     Years: 50.00     Pack years: 50.00     Types: Cigarettes    Smokeless tobacco: Never    Tobacco comments:     would like to quit    Substance Use Topics    Alcohol use: Yes     Comment: socially    Drug use: No       SURGICAL HISTORY:   Past Surgical History:   Procedure Laterality Date    BREAST SURGERY      mass removal, benign    HYSTERECTOMY         FAMILY HISTORY: Mother  from myocardial infarction. Father  from lung cancer. One sibling  from leukemia and bladder cancer. One who had a stroke. Two daughters alive and well. One is present in the office today.   Family History   Problem Relation Age of Onset    Heart Disease Mother     High Cholesterol Mother     Cancer Father     Arthritis Sister     High Blood Pressure Sister     High Cholesterol Sister     Cancer Brother     Heart Disease Brother     High Blood Pressure Brother     High Cholesterol Brother     Tuberculosis Brother       Family Status   Relation Name Status    Mother      Father      Sister  Alive    Brother  Alive        REVIEW OF SYSTEMS: The patients health assessment form was reviewed. Constitutional: General health is good. No weight changes. No fevers. No fatigue or weakness. Head: Patient has history of syncope. Skin: No history of changes in pigmentation. No easy bruising or bleeding. EENT: Patient denies any history of color blindness, inflammation, cataracts or glaucoma. Patient denies history of deafness, tinnitus, pain, discharge or recurrent infections. Patient denies history of rhinitis or nasal polyps. Lymphatic: Patient denies history of enlargement, inflammation or pain. Cardiovascular: History of palpations and chest pain. No ascites, rheumatic fever, cold extremities or edema. Respiration: Smokes. Patient denies wheezing or dyspnea. No hemoptysis, TB or asthma. No signs or symptoms of sleep apnea. Rib fracture  Gastrointestinal: Patient denies changes in appetite. No dysphagia, odynophagia or abdominal pain. No hematochezia, melena or hemorrhoids. Colonscopy for IBS was negative . Polyp, rectum, biopsy: Fragments of hyperplastic polyp. C.  Duodenum, biopsy: Duodenal mucosa with normal villous architecture, No significant pathologic abnormality identified. Genitourinary: Patient denies dysuria, frequency, urgency or incontinence. No history of stones. Musculoskeletal: History of arthritis or joint pains. Spinal > Lumber pain. Wrist fracture during move. Left knee OA with effusion received injections since (2019)  Breasts: Fibrocystic breast diease. Mamogram yearly negative. Neurological: Patient denies vertigo. Psychological: Anxiety. Endocrine: No polyuria or polyphagia. No diabetes. Vit D deficiency.   Hematopoietic: No history of bleeding disorders or easy bruising. Rheumatic: OA > knees. Worsens. No polyarthritis or inflammatory joint disease. PHYSICAL EXAMINATION:   Vitals:    12/22/22 1427   BP: (!) 137/98   Pulse: 84   Resp: 18   Temp: 96.9 °F (36.1 °C)   SpO2: 96%   Weight: 175 lb (79.4 kg)   Height: 5' 4\" (1.626 m)    Home Vitals given 130/85 mmHg. Pulse 83, Satuations 94%  Constitutional: This is a  78 y.o.  female  who is alert, oriented, cooperative and in no apparent distress. EENT: EOMI BARI  No conjunctival injections. External canals are patent and no discharge was appreciated. Septum was midline, mucosa was without erythema, exudates or cobblestoning. No thrush. Neck: Supple without thyromegaly. No elevated JVP. Trachea was midline. No carotid bruits. Respiratory: Symmetrical without dullness to percussion. Clear to auscultation. No wheezes, rhonchi or rales. No intercostal retraction or use of accessory muscles. No egophony noted. Cardiovascular: Regular without murmur, clicks, gallops or rubs. No heave. Pulses:  Equal bilaterally. Abdomen: Obese, rounded and soft without organomegaly. No rebound, rigidity or guarding was appreciated. Lymphatic: No lymphadenopathy. Musculoskeletal: Ambulate without assistance. Normal curvature of the spine. No gross muscle weakness. Muscle size, tone and strength are normal. No involuntary movements are noted. OA left knee, back and foot. Extremities:  Trace edema. No ulcerations, tenderness or erythema. Deep tendon reflexes are normal. Left knee pain with edema  Skin:  Warm and dry. Neurological/Psychiatric: General behavior, level of consciousness, thought content and emotional status is normal.  Cranial nerves II-XII are intact. DATA:     Previous Spirometry demonstrates an FVC of 2.66 liters which is 96 % of predicted with an FEV1 of 2.23 liters which is 104 % of predicted. FEV1/FVC ratio is 84 %. Mid expiratory flow rates are 152 % of predicted.   Maximum voluntary ventilation is 113 liters per minute or 127 % of predicted. Flow volume loop shows no signs of intrathoracic or extrathoracic process. Impression: Normal Spirometry    Static Lung Volume: reveal a slow vital capacity of 3.74 liters which is 115 % of predicted. The inspiratory capacity is 2.11 liters, 95% of predicted. The thoracic gas volume is 2.97 liters which is normal.  The total lung capacity is 5.08 liters, 98 % of predicted. The RV/TLC ratio is 64 % of predicted. The DlCO is 20.70 ml/min/mmHg which is 82% of predicted. The DlCO/VA (krogh constant) is 6.20 ml/min/mmHg, 120 % of predicted. CT SCAN CHEST 2022: Mediastinum: Thyroid is homogeneous in attenuation. No bulky mediastinal  adenopathy. Central airways are patent. Esophagus is normal course and caliber to the distal segment where there is a small hiatal hernia. Cardiac size within normal limits without pericardial effusion. Lungs/pleura: Lungs are clear without focal opacification or consolidation. Minimal biapical pleuroparenchymal scarring. No dominant nodule or mass  lesion. No pleural effusion or pleural process. Upper Abdomen: Visualized portions of the upper abdomen unremarkable. Soft Tissues/Bones: No acute osseous findings. Left breast soft tissue nodular mass density 16 mm extending to the edge or slightly outside the field of view stable to slightly enlarged from prior where this was noted without obvious adenopathy in the left chest wall or axillary region. CT SCAN CHEST 10/2021: Mediastinum: No abnormal lymphadenopathy. The pulmonary trunk is normal in size. Lungs/pleura:   No pulmonary nodules. No pleural effusions or pneumothorax. No definite pleural thickening. Upper Abdomen: No acute process identified. Soft Tissues/Bones: No aggressive osseous lesions. Again demonstrated is 1.5 cm left breast soft tissue lesion (image 54 series 3), which is decreased in size when compared to January 2017.  Work up done on breast (-)    CT SCAN CHEST [7/2020]:  Degenerative changes noted in the spine. Thoracic aorta is normal in caliber and mildly atherosclerotic. Stable nodules in the right upper lobe measuring up to 4 mm in size. Central airways are patent. No new nodule, pleural effusion or pneumothorax seen. Thyroid gland and esophagus are unremarkable. Heart is normal in size. No pericardial effusion. Colonic diverticulum with no evidence of diverticulitis. Visualized upper abdominal organs are otherwise unremarkable. No axillary, mediastinal, hilar or supraclavicular lymphadenopathy seen. Impression - Stable    CT SCAN CHEST [8/2019] We reviewed the films during the inter-disciplinary rounds/conference, which showed degenerative changes noted in the spine. Thoracic aorta is normal in caliber and mildly atherosclerotic. Stable nodules in the right upper lobe measuring up to 4 mm in size. Central airways are patent. No new nodule, pleural effusion or pneumothorax seen. Thyroid gland and esophagus are unremarkable. Heart is normal in size. No pericardial effusion. Colonic diverticuli with no evidence of diverticulitis. Visualized upper abdominal organs are otherwise unremarkable. No axillary, mediastinal, hilar or supraclavicular lymphadenopathy seen. CHEST CT SCAN [8/2/2018]: Impression: Noted 2 previously documented nodules in the right upper lung are less conspicuous, representing a lung RADS category 2 (benign finding). There is a previously documented left breast mass of 2.3 cm diameter, which is not new, and may represent fibrocystic change. Mammographic  and/or ultrasound to better characterize this finding.     CHEST CT SCAN [1/2018]: Pulmonary nodule (series 3, image 17), measuring approximately 0.55 cm x 0.44 cm, not seen on the previous exam. Pulmonary nodule (series 3, image 11), measuring approximately 0.3 cm, stable when compared with the previous exam. Left lung: No noncalcified pulmonary nodule or spiculated mass. No supraclavicular, axillary, or mediastinal lymphadenopathy. Hilar lymphadenopathy is difficult to ascertain given the lack of IV contrast administration. . A mass like lesion is noted projecting within the left breast soft tissues, measuring approximately 2.4 x 2.4 cm, partially excluded from the field-of-view. Finding was present on the previous exam. Visualized portions of the thyroid, heart, esophagus, stomach, liver, gallbladder, spleen, adrenals, kidneys are unremarkable. Pancreas is unremarkable. No lytic or blastic bony lesions. CHEST CT SCAN [2017] Impression: Stable right apical pulmonary nodule as described above. Recommend follow up as per Fleischner criteria below. 2. No focal consolidation, pleural effusion or pneumothorax. 3. Cystic mass lesion in the subcutaneous soft tissues of the anterior left chest wall.  ([facility]    CHEST CT SCAN [2016] Pulmonary nodule (series 3, image 11), measuring approximately 0.31 cm, enlarged since previous exam at which time it measured approximately 0.18 cm. 2. Pulmonary nodule (series 3, image 20), measuring approximately 0.27 cm, not seen on previous exam.  Left lun. No noncalcified pulmonary nodule or spiculated mass. No supraclavicular, mediastinal, or hilar lymphadenopathy is identified. Bilateral axillary lymphadenopathy, on the left measure approximately 1.4 x 1.7 cm and on the right measuring approximately  1.2 x 1.4 cm. CHEST CT SCAN [2015] There are nodular opacities within the left breast unchanged. The adrenal glands appear normal. There are again spinal degenerative changes. Since , there is bi apical pleural parenchymal scars. Again no discrete pulmonary nodule is seen. Scars changes at the medial aspect of the right middle lobe and into the lingula peripherally.      CHEST CT SCAN [2014] There are nodular opacities within the left breast one posteriorly and centrally 8.5 mm and the other centrally and more peripherally 1.5 cm. The adrenal glands appear normal. There are again spinal degenerative changes. Since 2011 and 2013 again seen are bi apical pleural parenchymal scars. No discrete pulmonary nodule is seen. Again seen are scars into the medial aspect of the right middle lobe and into the lingula peripherally. There is stable band of ground glass density into the periphery of the left upper lobe. CHEST CT SCAN [6/2013] Films were read/reviewed/discussed with radiology and shows there are increased interstitial markings on today's examination and this may represent dependent atelectasis. No other mass nodule or infiltrate is identified. The mediastinum fails to demonstrate pathologic adenopathy. There is no hilar adenopathy. There is no axillary beth disease. The adrenal glands are not enlarged. There are no bony abnormalities. Impression: stable appearing right upper lobe lung pulmonary nodule. There are no acute changes or new findings     SIX MINUTE WALK TEST: INTERPRETATION:2017  Using standard reference equation for the six minute walk test [6MWT], the patient is at 85 % of predicted. IMPRESSION:    Earline Palomino is a 78 y.o. female with  right lung nodules 0.4 mm with mild COPD stable in a active smoker. With this in mind, we would like to proceed with the following;                PLAN:    As for her pulmonary disease I asked her to abstain from smoking, will also have to be keep very close evaluation on her new symptoms of chronic mucus production likely related to the development of worsening of her COPD and may need to institute bronchodilators. Rest of the use albuterol as needed. We have followed Maura Tong lung nodule for >10 years and continue to follow because of her family history and smoking. The annual CT screening test for her lung nodules. She is to follow up in  6 months for COPD and we will now go yearly on the CT scan since she continues to smoke.  We again discussed the smoking cessation program. Health maintenance screening evaluation such as breast and colonoscopy are normal and up-to-date. Request records from Darline Jernigan on there Echocardiogram. We hope this updates you on our evaluation and clinical thinking. We discussed her new  she met online dating's site. Thank you for entrusting us to participate in [facility].       Sincerely,      Noe Reed DO, MPH, Nelda Mckeon  Professor of Internal Medicine  Director, [address] [facility]

## 2022-12-22 NOTE — PROGRESS NOTES
Patient to follow up with physician in 6 months. Patient will have albuterol refilled at office visit.

## 2023-06-04 ENCOUNTER — HOSPITAL ENCOUNTER (EMERGENCY)
Age: 78
Discharge: HOME | End: 2023-06-04
Payer: MEDICARE

## 2023-06-04 VITALS
OXYGEN SATURATION: 98 % | DIASTOLIC BLOOD PRESSURE: 97 MMHG | TEMPERATURE: 97.7 F | WEIGHT: 168 LBS | HEIGHT: 64 IN | SYSTOLIC BLOOD PRESSURE: 170 MMHG | BODY MASS INDEX: 28.68 KG/M2 | HEART RATE: 88 BPM | RESPIRATION RATE: 16 BRPM

## 2023-06-04 DIAGNOSIS — H18.821 CORNEAL ABRASION DUE TO CONTACT LENS, RIGHT: Primary | ICD-10-CM

## 2023-06-04 PROCEDURE — 99283 EMERGENCY DEPT VISIT LOW MDM: CPT

## 2023-06-04 PROCEDURE — 6370000000 HC RX 637 (ALT 250 FOR IP): Performed by: PHYSICIAN ASSISTANT

## 2023-06-04 RX ORDER — TETRACAINE HYDROCHLORIDE 5 MG/ML
2 SOLUTION OPHTHALMIC ONCE
Status: COMPLETED | OUTPATIENT
Start: 2023-06-04 | End: 2023-06-04

## 2023-06-04 RX ORDER — IBUPROFEN 600 MG/1
600 TABLET ORAL EVERY 6 HOURS PRN
Qty: 20 TABLET | Refills: 0 | Status: SHIPPED | OUTPATIENT
Start: 2023-06-04 | End: 2023-06-09

## 2023-06-04 RX ORDER — IBUPROFEN 600 MG/1
600 TABLET ORAL ONCE
Status: COMPLETED | OUTPATIENT
Start: 2023-06-04 | End: 2023-06-04

## 2023-06-04 RX ORDER — MOXIFLOXACIN 5 MG/ML
SOLUTION/ DROPS OPHTHALMIC
Qty: 3 ML | Refills: 0 | Status: SHIPPED | OUTPATIENT
Start: 2023-06-04 | End: 2023-06-11

## 2023-06-04 RX ORDER — CIPROFLOXACIN HYDROCHLORIDE 3.5 MG/ML
1 SOLUTION/ DROPS TOPICAL ONCE
Status: COMPLETED | OUTPATIENT
Start: 2023-06-04 | End: 2023-06-04

## 2023-06-04 RX ADMIN — TETRACAINE HYDROCHLORIDE 2 DROP: 5 SOLUTION/ DROPS OPHTHALMIC at 19:12

## 2023-06-04 RX ADMIN — Medication 1 DROP: at 19:47

## 2023-06-04 RX ADMIN — FLUORESCEIN SODIUM 1 MG: 1 STRIP OPHTHALMIC at 19:12

## 2023-06-04 RX ADMIN — IBUPROFEN 600 MG: 600 TABLET ORAL at 19:47

## 2023-06-04 ASSESSMENT — VISUAL ACUITY
OD: 20/200
OS: 20/50

## 2023-06-04 ASSESSMENT — PAIN DESCRIPTION - PAIN TYPE: TYPE: ACUTE PAIN

## 2023-06-04 ASSESSMENT — PAIN DESCRIPTION - LOCATION: LOCATION: EYE

## 2023-06-04 ASSESSMENT — PAIN SCALES - GENERAL: PAINLEVEL_OUTOF10: 8

## 2023-06-04 ASSESSMENT — PAIN - FUNCTIONAL ASSESSMENT: PAIN_FUNCTIONAL_ASSESSMENT: 0-10

## 2023-06-04 ASSESSMENT — PAIN DESCRIPTION - DESCRIPTORS: DESCRIPTORS: ITCHING;DISCOMFORT

## 2023-06-04 ASSESSMENT — PAIN DESCRIPTION - ORIENTATION: ORIENTATION: RIGHT

## 2023-06-05 NOTE — ED PROVIDER NOTES
her brother and mother; High Blood Pressure in her brother and sister; High Cholesterol in her brother, mother, and sister; Tuberculosis in her brother. Allergies: Diclofenac sodium, Celebrex [celecoxib], and Lipitor    Physical Exam   Oxygen Saturation Interpretation: Normal.        ED Triage Vitals [06/04/23 1643]   BP Temp Temp Source Pulse Respirations SpO2 Height Weight - Scale   (!) 170/97 97.7 °F (36.5 °C) Oral 88 16 98 % 5' 4\" (1.626 m) 168 lb (76.2 kg)       Physical Exam  Constitutional:  Alert, development consistent with age. HENT:  NC/NT. Airway patent. Neck:  Normal ROM. Supple. Eyes:         Pupils: equal, round, reactive to light and accommodation. Eyelids: Right upper Swelling/redness:  mild swelling w/o erythrema. Conjunctiva: Right injected(red). Sclera: Bilateral normal appearing. Cornea:  with regular exam appears normal, no obvious FB or ulceration       EOM:  Intact Bilaterally. Fundoscopic:  not well visualized. Visual Acuity:  wnl without contact in right eye  IOP right: 20, IOP left 19. Integument:  No rashes, erythema present, unless noted elsewhere. Lymphatics: No lymphangitis or adenopathy noted. Neurological:  Oriented. Motor functions intact. Lab / Imaging Results   (All laboratory and radiology results have been personally reviewed by myself)  Labs:  No results found for this visit on 06/04/23. Imaging: All Radiology results interpreted by Radiologist unless otherwise noted.   No orders to display       ED Course / Medical Decision Making     Medications   tetracaine (TETRAVISC) 0.5 % ophthalmic solution 2 drop (2 drops Ophthalmic Given by Other 6/4/23 1912)   fluorescein ophthalmic strip 1 mg (1 mg Right Eye Given by Other 6/4/23 1912)   ciprofloxacin (CILOXAN) 0.3 % ophthalmic solution 1 drop (1 drop Right Eye Given 6/1945)   ibuprofen (ADVIL;MOTRIN) tablet 600 mg (600 mg Oral Given 6/1945)        Re-examination:  6/4/23

## 2023-06-27 ENCOUNTER — OFFICE VISIT (OUTPATIENT)
Dept: PULMONOLOGY | Age: 78
End: 2023-06-27
Payer: MEDICARE

## 2023-06-27 VITALS
HEIGHT: 65 IN | WEIGHT: 169 LBS | RESPIRATION RATE: 18 BRPM | BODY MASS INDEX: 28.16 KG/M2 | SYSTOLIC BLOOD PRESSURE: 156 MMHG | DIASTOLIC BLOOD PRESSURE: 76 MMHG | OXYGEN SATURATION: 96 % | TEMPERATURE: 97.6 F | HEART RATE: 67 BPM

## 2023-06-27 DIAGNOSIS — F17.200 SMOKER: ICD-10-CM

## 2023-06-27 DIAGNOSIS — Z23 ENCOUNTER FOR IMMUNIZATION: ICD-10-CM

## 2023-06-27 DIAGNOSIS — R91.1 LUNG NODULE: Primary | ICD-10-CM

## 2023-06-27 DIAGNOSIS — J44.0 CHRONIC OBSTRUCTIVE PULMONARY DISEASE WITH ACUTE LOWER RESPIRATORY INFECTION (HCC): ICD-10-CM

## 2023-06-27 DIAGNOSIS — H57.12 EYE PAIN, LEFT: ICD-10-CM

## 2023-06-27 DIAGNOSIS — Z87.891 PERSONAL HISTORY OF TOBACCO USE: ICD-10-CM

## 2023-06-27 LAB
EXPIRATORY TIME: NORMAL
FEF 25-75% %PRED-PRE: NORMAL
FEF 25-75% PRED: NORMAL
FEF 25-75%-PRE: NORMAL
FEV1 %PRED-PRE: 95 %
FEV1 PRED: 2.11 L
FEV1/FVC %PRED-PRE: 111 %
FEV1/FVC PRED: 77 %
FEV1/FVC: 86 %
FEV1: 2.01 L
FVC %PRED-PRE: 85 %
FVC PRED: 2.76 L
FVC: 2.35 L
PEF %PRED-PRE: NORMAL
PEF PRED: NORMAL
PEF-PRE: NORMAL

## 2023-06-27 PROCEDURE — 94010 BREATHING CAPACITY TEST: CPT | Performed by: INTERNAL MEDICINE

## 2023-06-27 PROCEDURE — G0009 ADMIN PNEUMOCOCCAL VACCINE: HCPCS | Performed by: INTERNAL MEDICINE

## 2023-06-27 PROCEDURE — 99213 OFFICE O/P EST LOW 20 MIN: CPT | Performed by: INTERNAL MEDICINE

## 2023-06-27 PROCEDURE — G0296 VISIT TO DETERM LDCT ELIG: HCPCS | Performed by: INTERNAL MEDICINE

## 2023-06-27 PROCEDURE — 99214 OFFICE O/P EST MOD 30 MIN: CPT | Performed by: INTERNAL MEDICINE

## 2023-06-27 PROCEDURE — 1123F ACP DISCUSS/DSCN MKR DOCD: CPT | Performed by: INTERNAL MEDICINE

## 2023-06-27 PROCEDURE — 94726 PLETHYSMOGRAPHY LUNG VOLUMES: CPT | Performed by: INTERNAL MEDICINE

## 2023-06-27 RX ORDER — CIPROFLOXACIN HYDROCHLORIDE 3.5 MG/ML
1 SOLUTION/ DROPS TOPICAL
Qty: 5 ML | Refills: 0 | Status: SHIPPED | OUTPATIENT
Start: 2023-06-27 | End: 2023-07-07

## 2023-06-27 ASSESSMENT — PULMONARY FUNCTION TESTS
FEV1/FVC: 86
FEV1/FVC_PREDICTED: 77
FEV1_PREDICTED: 2.11
FVC_PREDICTED: 2.76
FEV1/FVC_PERCENT_PREDICTED_PRE: 111
FEV1_PERCENT_PREDICTED_PRE: 95
FEV1: 2.01
FVC: 2.35
FVC_PERCENT_PREDICTED_PRE: 85

## 2023-10-02 ENCOUNTER — TELEPHONE (OUTPATIENT)
Dept: PULMONOLOGY | Age: 78
End: 2023-10-02

## 2023-10-02 NOTE — TELEPHONE ENCOUNTER
Mailed a letter to patient informing her that her CT chest is scheduled for 11-29-23 at 10:30 am at the [facility]. She must arrive by 10:00 am. There is no prep for this test

## 2023-11-28 ENCOUNTER — TELEPHONE (OUTPATIENT)
Dept: CASE MANAGEMENT | Age: 78
End: 2023-11-28

## 2023-11-28 NOTE — TELEPHONE ENCOUNTER
I called the patient and left a message reminding her of the CT lung screening at [facility] on 11/29/2023 at 10:30 am with an 10:00 am arrival.  If unable to keep this appt call the office at [phone] to get rescheduled.             Electronically signed by Amalia Christian on 11/28/23 at 2:29 PM EST

## 2023-11-29 ENCOUNTER — HOSPITAL ENCOUNTER (OUTPATIENT)
Dept: CT IMAGING | Age: 78
Discharge: HOME | End: 2023-12-01
Attending: INTERNAL MEDICINE
Payer: MEDICARE

## 2023-11-29 DIAGNOSIS — Z87.891 PERSONAL HISTORY OF TOBACCO USE: ICD-10-CM

## 2023-11-29 PROCEDURE — 71271 CT THORAX LUNG CANCER SCR C-: CPT

## 2023-11-30 ENCOUNTER — TELEPHONE (OUTPATIENT)
Dept: CASE MANAGEMENT | Age: 78
End: 2023-11-30

## 2023-11-30 NOTE — TELEPHONE ENCOUNTER
No call, encounter opened to process CT Lung Screening. CT Lung Screen: 11/29/2023    IMPRESSION:  1. There is no pulmonary infiltrate, mass or suspicious pulmonary nodule. 2. Emphysematous changes     LUNG RADS:  Lung-RADS 1 - Negative ()     Management:  12 month screening LDCT     RECOMMENDATIONS:  If you would like to register your patient with the [place], please contact the Nurse Navigator at  [phone]. Pack years: 48    Social History     Tobacco Use  Smoking Status: Current Every Day Smoker    Start Date:    Quit Date:    Types: Cigarettes   Packs/Day: 1   Years: 48   Pack Years: 48   Smokeless Tobacco: Never         Results letter sent to patient via my chart or mailed.      [address]

## 2023-12-11 ENCOUNTER — TELEPHONE (OUTPATIENT)
Dept: PULMONOLOGY | Age: 78
End: 2023-12-11

## 2023-12-11 DIAGNOSIS — J44.9 CHRONIC OBSTRUCTIVE PULMONARY DISEASE, UNSPECIFIED COPD TYPE (HCC): Primary | ICD-10-CM

## 2023-12-11 DIAGNOSIS — R91.1 LUNG NODULE: ICD-10-CM

## 2023-12-11 NOTE — TELEPHONE ENCOUNTER
[address] [facility]  Department of Pulmonary, Critical Care and Sleep Medicine  Pulmonary [address] [place] [address]  Department of Internal Medicine  Office [facility], , MPH, NOMAN ERIC DO, Romy Mata MD, MS  Isidro Shelley, APRN-CNP    Today's Date:  2023  Patient Name:  Warden Gray  Patient's :  70 y.o., 1945    Phone Message Documentaton      Dear Albert Randle DO    We reached out to your patient Warden Gray 70 y.o. female about Her medical condition/complaint. COPD, smoking and lung nodule assessment    Her annual CT scan chest showed  FINDINGS: Mediastinum: The thoracic aorta is normal caliber. There is no pericardial effusion. No mediastinal or hilar lymphadenopathy is identified. Lungs/Pleura: There are emphysematous changes. There is no pulmonary infiltrate, mass or suspicious pulmonary nodule. There is no pleural thickening or pleural effusion. Upper Abdomen:   Limited images of the upper abdomen demonstrate no acute abnormality. Soft Tissues/Bones:  Age related degenerative changes of the visualized osseous structures without focal destructive lesion. IMPRESSION:  1. There is no pulmonary infiltrate, mass or suspicious pulmonary nodule. 2. Emphysematous changes         Follow up:  [x] Treatment Plan: RTC in 6 months. Yearly CT scan. Stop smoking  [x] Patient Verbalizes Understanding of Treatment Plan and/or Clinical Advise Given. Comment:   [x] Patient Understands to Call Back if Symptoms Worsen or go to the emergency room.      Deni Quiroga DO, Rudie Lauth  Professor of Internal Medicine  2023  3:44 PM

## 2024-01-09 ENCOUNTER — TELEPHONE (OUTPATIENT)
Dept: PULMONOLOGY | Age: 79
End: 2024-01-09

## 2024-01-09 NOTE — TELEPHONE ENCOUNTER
Patient states she needs something called in to [facility] in [place] because she has such a terrible cough that it hurts really bad when she does so.

## 2024-11-11 ENCOUNTER — TELEPHONE (OUTPATIENT)
Dept: PULMONOLOGY | Age: 79
End: 2024-11-11

## 2024-11-11 DIAGNOSIS — J44.0 CHRONIC OBSTRUCTIVE PULMONARY DISEASE WITH ACUTE LOWER RESPIRATORY INFECTION (HCC): ICD-10-CM

## 2024-11-11 DIAGNOSIS — R91.1 LUNG NODULE: Primary | ICD-10-CM

## 2024-11-11 NOTE — TELEPHONE ENCOUNTER
Mailed letter to patient to inform her of the CT of the Chest that is scheduled for her at [address] [facility] . This test is scheduled on  Friday, December 13, 2024 at  10:30 am. Please arrive 30 minutes prior to appointment time. No Test Prep is needed

## 2024-11-11 NOTE — TELEPHONE ENCOUNTER
Pt called in stating she is due for her annual CT scan. Spoke to nurse who stated we will order and schedule the CT for her and let her know when its scheduled for. Please advise.

## 2024-12-13 ENCOUNTER — HOSPITAL ENCOUNTER (OUTPATIENT)
Dept: CT IMAGING | Age: 79
Discharge: HOME | End: 2024-12-15
Attending: INTERNAL MEDICINE
Payer: MEDICARE

## 2024-12-13 DIAGNOSIS — R91.1 LUNG NODULE: ICD-10-CM

## 2024-12-13 DIAGNOSIS — J44.0 CHRONIC OBSTRUCTIVE PULMONARY DISEASE WITH ACUTE LOWER RESPIRATORY INFECTION (HCC): ICD-10-CM

## 2024-12-13 PROCEDURE — 71250 CT THORAX DX C-: CPT

## 2025-01-03 ENCOUNTER — TELEPHONE (OUTPATIENT)
Dept: PULMONOLOGY | Age: 80
End: 2025-01-03

## 2025-01-03 DIAGNOSIS — R91.1 LUNG NODULE: ICD-10-CM

## 2025-01-03 DIAGNOSIS — J44.9 CHRONIC OBSTRUCTIVE PULMONARY DISEASE, UNSPECIFIED COPD TYPE (HCC): Primary | ICD-10-CM

## 2025-01-03 DIAGNOSIS — Z72.0 TOBACCO ABUSE: ICD-10-CM

## 2025-01-03 NOTE — TELEPHONE ENCOUNTER
[facility]  Department of Pulmonary, Critical Care and Sleep Medicine  [facility]  Department of Internal Medicine  Office [phone]    Today's Date:  1/3/2025  Patient Name:  Alexia Friend  Patient's :  79 y.o., 1945    Phone Message Documentaton      Dear Juan Flores DO    We reached out to your patient Alexia Friend 79 y.o. female about Her medical condition/complaint.  COPD, smoking and lung nodule assessment    Her annual CT scan chest showed  FINDINGS: Mediastinum: The thoracic aorta is normal caliber.  There is no pericardial effusion.  No mediastinal or hilar lymphadenopathy is identified. Lungs/Pleura:  There are emphysematous changes.  There is no pulmonary infiltrate, mass or suspicious pulmonary nodule.  There is no pleural thickening or pleural effusion. Upper Abdomen:   Limited images of the upper abdomen demonstrate no acute abnormality. Soft Tissues/Bones:  Age related degenerative changes of the visualized osseous structures without focal destructive lesion.     IMPRESSION:  1. There is no pulmonary infiltrate, mass or suspicious pulmonary nodule.  2. Emphysematous changes         Follow up:  [x] Treatment Plan: RTC in 6 months. Yearly CT scan. Stop smoking.       Sincere Sorenson DO, FCCP, MAGALYS, SHABBIRP  Professor of Internal Medicine  1/3/2025  1:19 PM

## 2025-02-04 ENCOUNTER — HOSPITAL ENCOUNTER (EMERGENCY)
Age: 80
Discharge: LEFT BEFORE DISCHARGE | End: 2025-02-04
Attending: STUDENT IN AN ORGANIZED HEALTH CARE EDUCATION/TRAINING PROGRAM
Payer: MEDICARE

## 2025-02-04 VITALS
TEMPERATURE: 98.8 F | OXYGEN SATURATION: 95 % | BODY MASS INDEX: 28.32 KG/M2 | SYSTOLIC BLOOD PRESSURE: 155 MMHG | RESPIRATION RATE: 18 BRPM | DIASTOLIC BLOOD PRESSURE: 84 MMHG | HEIGHT: 65 IN | HEART RATE: 106 BPM | WEIGHT: 170 LBS

## 2025-02-04 DIAGNOSIS — Z53.21 ELOPED FROM EMERGENCY DEPARTMENT: Primary | ICD-10-CM

## 2025-02-04 PROCEDURE — 99281 EMR DPT VST MAYX REQ PHY/QHP: CPT

## 2025-02-04 NOTE — ED NOTES
Department of Emergency Medicine  FIRST PROVIDER TRIAGE NOTE             Independent MLP           2/4/25  6:35 PM EST    Date of Encounter: 2/4/25   MRN: [id number]      HPI: Alexia Friend is a 79 y.o. female who presents to the ED for Fever (102, onset last weekend) and Cough     Patient presents to emergency from urgent care where she had a fever and had high blood pressure so they sent her here.  Her symptoms are related to hematuria and urinary frequency.  She did not realize she had a fever.  She denies abdominal pain or back pain.  She reports she just does not feel well.  There is no upper respiratory infection per patient she reports she has a chronic cough (smoker)    Vitals:    02/04/25 1827   BP: (!) 155/84   Pulse: (!) 106   Resp: 18   Temp: 98.8 °F (37.1 °C)   SpO2: 95%         ROS: Negative for cp, sob, back pain, vomiting, diarrhea, rash, or headache.    PE: Gen Appearance/Constitutional: alert  CV: tachycardia  GI: soft and NT     Initial Plan of Care:  Plan to order/Initiate the following while awaiting opening in ED: labs and antibiotics.   Instructed patient and/or family member with patient if any worsening condition to notify staff.    Provider-Patient relationship only established for Provider In Triage (PIT) secondary to high volume, low staffing, and/or boarding- patient to await bed availability..  Full assessment, HPI and examination not performed.  Discussed with patient that the provider in triage evaluation is not a comprehensive medical screening exam and this is not yet possible at the end of the provider in triage encounter, to state whether or not an emergency medical condition exist  This ends my PIT-Patient relationship.    Electronically signed by Fatoumata Colon PA-C   DD: 2/4/25

## 2025-02-05 NOTE — ED PROVIDER NOTES
Patient eloped prior to history of physical exam. I never evaluated this patient.      Suyapa Singletary DO  02/04/25 2047

## 2025-02-24 ENCOUNTER — TELEPHONE (OUTPATIENT)
Dept: PULMONOLOGY | Age: 80
End: 2025-02-24

## 2025-02-24 NOTE — TELEPHONE ENCOUNTER
Mailed letter to patient to inform her of the CT of the Chest that is scheduled for her at [place], [address] [place] . This test is scheduled on  Thursday, July 3, 2025 at  11:00 am. Please arrive 30 minutes prior to appointment time. No Test Prep is needed